# Patient Record
Sex: FEMALE | Race: WHITE | NOT HISPANIC OR LATINO | Employment: OTHER | URBAN - METROPOLITAN AREA
[De-identification: names, ages, dates, MRNs, and addresses within clinical notes are randomized per-mention and may not be internally consistent; named-entity substitution may affect disease eponyms.]

---

## 2017-02-23 ENCOUNTER — ALLSCRIPTS OFFICE VISIT (OUTPATIENT)
Dept: OTHER | Facility: OTHER | Age: 82
End: 2017-02-23

## 2017-02-23 ENCOUNTER — TRANSCRIBE ORDERS (OUTPATIENT)
Dept: LAB | Facility: CLINIC | Age: 82
End: 2017-02-23

## 2017-02-23 ENCOUNTER — APPOINTMENT (OUTPATIENT)
Dept: LAB | Facility: CLINIC | Age: 82
End: 2017-02-23
Payer: MEDICARE

## 2017-02-23 DIAGNOSIS — E78.5 HYPERLIPIDEMIA, UNSPECIFIED HYPERLIPIDEMIA TYPE: ICD-10-CM

## 2017-02-23 DIAGNOSIS — E78.5 HYPERLIPIDEMIA, UNSPECIFIED HYPERLIPIDEMIA TYPE: Primary | ICD-10-CM

## 2017-02-23 DIAGNOSIS — R06.00 DYSPNEA, UNSPECIFIED TYPE: ICD-10-CM

## 2017-02-23 DIAGNOSIS — R73.01 IMPAIRED FASTING GLUCOSE: ICD-10-CM

## 2017-02-23 LAB
ALBUMIN SERPL BCP-MCNC: 3.3 G/DL (ref 3.5–5)
ALP SERPL-CCNC: 115 U/L (ref 46–116)
ALT SERPL W P-5'-P-CCNC: 22 U/L (ref 12–78)
ANION GAP SERPL CALCULATED.3IONS-SCNC: 8 MMOL/L (ref 4–13)
AST SERPL W P-5'-P-CCNC: 11 U/L (ref 5–45)
BASOPHILS # BLD AUTO: 0.03 THOUSANDS/ΜL (ref 0–0.1)
BASOPHILS NFR BLD AUTO: 0 % (ref 0–1)
BILIRUB SERPL-MCNC: 0.47 MG/DL (ref 0.2–1)
BUN SERPL-MCNC: 23 MG/DL (ref 5–25)
CALCIUM SERPL-MCNC: 9.6 MG/DL (ref 8.3–10.1)
CHLORIDE SERPL-SCNC: 104 MMOL/L (ref 100–108)
CHOLEST SERPL-MCNC: 266 MG/DL (ref 50–200)
CO2 SERPL-SCNC: 27 MMOL/L (ref 21–32)
CREAT SERPL-MCNC: 0.98 MG/DL (ref 0.6–1.3)
CREAT UR-MCNC: 137 MG/DL
EOSINOPHIL # BLD AUTO: 0.25 THOUSAND/ΜL (ref 0–0.61)
EOSINOPHIL NFR BLD AUTO: 3 % (ref 0–6)
ERYTHROCYTE [DISTWIDTH] IN BLOOD BY AUTOMATED COUNT: 12.7 % (ref 11.6–15.1)
EST. AVERAGE GLUCOSE BLD GHB EST-MCNC: 189 MG/DL
GFR SERPL CREATININE-BSD FRML MDRD: 54.7 ML/MIN/1.73SQ M
GLUCOSE SERPL-MCNC: 141 MG/DL (ref 65–140)
HBA1C MFR BLD: 8.2 % (ref 4.2–6.3)
HCT VFR BLD AUTO: 39.1 % (ref 34.8–46.1)
HDLC SERPL-MCNC: 40 MG/DL (ref 40–60)
HGB BLD-MCNC: 12.7 G/DL (ref 11.5–15.4)
LDLC SERPL CALC-MCNC: 175 MG/DL (ref 0–100)
LYMPHOCYTES # BLD AUTO: 2.91 THOUSANDS/ΜL (ref 0.6–4.47)
LYMPHOCYTES NFR BLD AUTO: 29 % (ref 14–44)
MCH RBC QN AUTO: 30.4 PG (ref 26.8–34.3)
MCHC RBC AUTO-ENTMCNC: 32.5 G/DL (ref 31.4–37.4)
MCV RBC AUTO: 94 FL (ref 82–98)
MICROALBUMIN UR-MCNC: 38.5 MG/L (ref 0–20)
MICROALBUMIN/CREAT 24H UR: 28 MG/G CREATININE (ref 0–30)
MONOCYTES # BLD AUTO: 0.81 THOUSAND/ΜL (ref 0.17–1.22)
MONOCYTES NFR BLD AUTO: 8 % (ref 4–12)
NEUTROPHILS # BLD AUTO: 5.97 THOUSANDS/ΜL (ref 1.85–7.62)
NEUTS SEG NFR BLD AUTO: 60 % (ref 43–75)
NRBC BLD AUTO-RTO: 0 /100 WBCS
PLATELET # BLD AUTO: 283 THOUSANDS/UL (ref 149–390)
PMV BLD AUTO: 10.1 FL (ref 8.9–12.7)
POTASSIUM SERPL-SCNC: 4.4 MMOL/L (ref 3.5–5.3)
PROT SERPL-MCNC: 7.1 G/DL (ref 6.4–8.2)
RBC # BLD AUTO: 4.18 MILLION/UL (ref 3.81–5.12)
SODIUM SERPL-SCNC: 139 MMOL/L (ref 136–145)
TRIGL SERPL-MCNC: 256 MG/DL
TSH SERPL DL<=0.05 MIU/L-ACNC: 1.73 UIU/ML (ref 0.36–3.74)
WBC # BLD AUTO: 10.03 THOUSAND/UL (ref 4.31–10.16)

## 2017-02-23 PROCEDURE — 83036 HEMOGLOBIN GLYCOSYLATED A1C: CPT

## 2017-02-23 PROCEDURE — 82570 ASSAY OF URINE CREATININE: CPT

## 2017-02-23 PROCEDURE — 84443 ASSAY THYROID STIM HORMONE: CPT

## 2017-02-23 PROCEDURE — 85025 COMPLETE CBC W/AUTO DIFF WBC: CPT

## 2017-02-23 PROCEDURE — 80061 LIPID PANEL: CPT

## 2017-02-23 PROCEDURE — 80053 COMPREHEN METABOLIC PANEL: CPT

## 2017-02-23 PROCEDURE — 82043 UR ALBUMIN QUANTITATIVE: CPT

## 2017-02-23 PROCEDURE — 36415 COLL VENOUS BLD VENIPUNCTURE: CPT

## 2017-03-06 ENCOUNTER — TRANSCRIBE ORDERS (OUTPATIENT)
Dept: ADMINISTRATIVE | Facility: HOSPITAL | Age: 82
End: 2017-03-06

## 2017-03-06 DIAGNOSIS — R01.1 UNDIAGNOSED CARDIAC MURMURS: Primary | ICD-10-CM

## 2017-03-06 DIAGNOSIS — R06.02 SHORTNESS OF BREATH: ICD-10-CM

## 2017-03-06 DIAGNOSIS — Z12.31 VISIT FOR SCREENING MAMMOGRAM: Primary | ICD-10-CM

## 2017-03-06 DIAGNOSIS — R06.02 SHORTNESS OF BREATH: Primary | ICD-10-CM

## 2017-03-07 ENCOUNTER — GENERIC CONVERSION - ENCOUNTER (OUTPATIENT)
Dept: OTHER | Facility: OTHER | Age: 82
End: 2017-03-07

## 2017-03-08 ENCOUNTER — TRANSCRIBE ORDERS (OUTPATIENT)
Dept: ADMINISTRATIVE | Facility: HOSPITAL | Age: 82
End: 2017-03-08

## 2017-03-08 ENCOUNTER — HOSPITAL ENCOUNTER (OUTPATIENT)
Dept: MAMMOGRAPHY | Facility: CLINIC | Age: 82
Discharge: HOME | End: 2017-03-08
Payer: MEDICARE

## 2017-03-08 ENCOUNTER — APPOINTMENT (OUTPATIENT)
Dept: NON INVASIVE DIAGNOSTICS | Facility: CLINIC | Age: 82
End: 2017-03-08
Payer: MEDICARE

## 2017-03-08 ENCOUNTER — HOSPITAL ENCOUNTER (OUTPATIENT)
Dept: RADIOLOGY | Facility: CLINIC | Age: 82
Discharge: HOME | End: 2017-03-08
Payer: MEDICARE

## 2017-03-08 ENCOUNTER — HOSPITAL ENCOUNTER (OUTPATIENT)
Dept: NON INVASIVE DIAGNOSTICS | Facility: CLINIC | Age: 82
Discharge: HOME | End: 2017-03-08
Payer: MEDICARE

## 2017-03-08 DIAGNOSIS — R01.1 UNDIAGNOSED CARDIAC MURMURS: ICD-10-CM

## 2017-03-08 DIAGNOSIS — Z12.31 VISIT FOR SCREENING MAMMOGRAM: ICD-10-CM

## 2017-03-08 DIAGNOSIS — R06.02 SHORTNESS OF BREATH: ICD-10-CM

## 2017-03-08 DIAGNOSIS — R06.02 SHORTNESS OF BREATH: Primary | ICD-10-CM

## 2017-03-08 PROCEDURE — G0202 SCR MAMMO BI INCL CAD: HCPCS

## 2017-03-08 PROCEDURE — 77063 BREAST TOMOSYNTHESIS BI: CPT

## 2017-03-08 PROCEDURE — 93306 TTE W/DOPPLER COMPLETE: CPT

## 2017-03-08 PROCEDURE — 71020 HB CHEST X-RAY 2VW FRONTAL&LATL: CPT

## 2017-03-10 ENCOUNTER — GENERIC CONVERSION - ENCOUNTER (OUTPATIENT)
Dept: OTHER | Facility: OTHER | Age: 82
End: 2017-03-10

## 2017-03-16 ENCOUNTER — GENERIC CONVERSION - ENCOUNTER (OUTPATIENT)
Dept: OTHER | Facility: OTHER | Age: 82
End: 2017-03-16

## 2017-03-17 ENCOUNTER — GENERIC CONVERSION - ENCOUNTER (OUTPATIENT)
Dept: OTHER | Facility: OTHER | Age: 82
End: 2017-03-17

## 2017-03-17 ENCOUNTER — HOSPITAL ENCOUNTER (OUTPATIENT)
Dept: PULMONOLOGY | Facility: HOSPITAL | Age: 82
Discharge: HOME | End: 2017-03-17
Payer: MEDICARE

## 2017-03-17 DIAGNOSIS — R06.02 SHORTNESS OF BREATH: ICD-10-CM

## 2017-03-17 PROCEDURE — 94729 DIFFUSING CAPACITY: CPT

## 2017-03-17 PROCEDURE — 94726 PLETHYSMOGRAPHY LUNG VOLUMES: CPT

## 2017-03-17 PROCEDURE — 94010 BREATHING CAPACITY TEST: CPT

## 2017-03-17 PROCEDURE — 94760 N-INVAS EAR/PLS OXIMETRY 1: CPT

## 2017-03-23 ENCOUNTER — ALLSCRIPTS OFFICE VISIT (OUTPATIENT)
Dept: OTHER | Facility: OTHER | Age: 82
End: 2017-03-23

## 2017-06-01 ENCOUNTER — ALLSCRIPTS OFFICE VISIT (OUTPATIENT)
Dept: OTHER | Facility: OTHER | Age: 82
End: 2017-06-01

## 2017-06-07 DIAGNOSIS — E11.9 TYPE 2 DIABETES MELLITUS WITHOUT COMPLICATIONS (HCC): ICD-10-CM

## 2017-06-07 DIAGNOSIS — E78.5 HYPERLIPIDEMIA: ICD-10-CM

## 2017-07-03 ENCOUNTER — APPOINTMENT (OUTPATIENT)
Dept: LAB | Facility: CLINIC | Age: 82
End: 2017-07-03
Payer: MEDICARE

## 2017-07-03 ENCOUNTER — TRANSCRIBE ORDERS (OUTPATIENT)
Dept: LAB | Facility: CLINIC | Age: 82
End: 2017-07-03

## 2017-07-03 DIAGNOSIS — E78.5 HYPERLIPIDEMIA: ICD-10-CM

## 2017-07-03 DIAGNOSIS — E11.9 DIABETES MELLITUS WITHOUT COMPLICATION (HCC): Primary | ICD-10-CM

## 2017-07-03 DIAGNOSIS — E11.9 TYPE 2 DIABETES MELLITUS WITHOUT COMPLICATIONS (HCC): ICD-10-CM

## 2017-07-03 DIAGNOSIS — E11.9 DIABETES MELLITUS WITHOUT COMPLICATION (HCC): ICD-10-CM

## 2017-07-03 DIAGNOSIS — E78.5 OTHER AND UNSPECIFIED HYPERLIPIDEMIA: ICD-10-CM

## 2017-07-03 LAB
ALBUMIN SERPL BCP-MCNC: 3.5 G/DL (ref 3.5–5)
ALP SERPL-CCNC: 119 U/L (ref 46–116)
ALT SERPL W P-5'-P-CCNC: 26 U/L (ref 12–78)
ANION GAP SERPL CALCULATED.3IONS-SCNC: 7 MMOL/L (ref 4–13)
AST SERPL W P-5'-P-CCNC: 14 U/L (ref 5–45)
BILIRUB SERPL-MCNC: 0.47 MG/DL (ref 0.2–1)
BUN SERPL-MCNC: 21 MG/DL (ref 5–25)
CALCIUM SERPL-MCNC: 9.3 MG/DL (ref 8.3–10.1)
CHLORIDE SERPL-SCNC: 102 MMOL/L (ref 100–108)
CHOLEST SERPL-MCNC: 247 MG/DL (ref 50–200)
CO2 SERPL-SCNC: 28 MMOL/L (ref 21–32)
CREAT SERPL-MCNC: 0.88 MG/DL (ref 0.6–1.3)
EST. AVERAGE GLUCOSE BLD GHB EST-MCNC: 180 MG/DL
GFR SERPL CREATININE-BSD FRML MDRD: >60 ML/MIN/1.73SQ M
GLUCOSE P FAST SERPL-MCNC: 146 MG/DL (ref 65–99)
HBA1C MFR BLD: 7.9 % (ref 4.2–6.3)
HDLC SERPL-MCNC: 38 MG/DL (ref 40–60)
LDLC SERPL CALC-MCNC: 157 MG/DL (ref 0–100)
POTASSIUM SERPL-SCNC: 4.4 MMOL/L (ref 3.5–5.3)
PROT SERPL-MCNC: 7.1 G/DL (ref 6.4–8.2)
SODIUM SERPL-SCNC: 137 MMOL/L (ref 136–145)
TRIGL SERPL-MCNC: 262 MG/DL

## 2017-07-03 PROCEDURE — 80061 LIPID PANEL: CPT

## 2017-07-03 PROCEDURE — 80053 COMPREHEN METABOLIC PANEL: CPT

## 2017-07-03 PROCEDURE — 83036 HEMOGLOBIN GLYCOSYLATED A1C: CPT

## 2017-07-03 PROCEDURE — 36415 COLL VENOUS BLD VENIPUNCTURE: CPT

## 2017-07-05 ENCOUNTER — ALLSCRIPTS OFFICE VISIT (OUTPATIENT)
Dept: OTHER | Facility: OTHER | Age: 82
End: 2017-07-05

## 2017-07-05 ENCOUNTER — GENERIC CONVERSION - ENCOUNTER (OUTPATIENT)
Dept: OTHER | Facility: OTHER | Age: 82
End: 2017-07-05

## 2017-07-07 ENCOUNTER — GENERIC CONVERSION - ENCOUNTER (OUTPATIENT)
Dept: OTHER | Facility: OTHER | Age: 82
End: 2017-07-07

## 2017-07-10 ENCOUNTER — ALLSCRIPTS OFFICE VISIT (OUTPATIENT)
Dept: OTHER | Facility: OTHER | Age: 82
End: 2017-07-10

## 2017-10-06 ENCOUNTER — GENERIC CONVERSION - ENCOUNTER (OUTPATIENT)
Dept: OTHER | Facility: OTHER | Age: 82
End: 2017-10-06

## 2017-10-16 ENCOUNTER — TRANSCRIBE ORDERS (OUTPATIENT)
Dept: LAB | Facility: CLINIC | Age: 82
End: 2017-10-16

## 2017-10-16 ENCOUNTER — APPOINTMENT (OUTPATIENT)
Dept: LAB | Facility: CLINIC | Age: 82
End: 2017-10-16
Payer: MEDICARE

## 2017-10-16 DIAGNOSIS — E78.5 HYPERLIPIDEMIA, UNSPECIFIED HYPERLIPIDEMIA TYPE: ICD-10-CM

## 2017-10-16 DIAGNOSIS — E78.5 HYPERLIPIDEMIA, UNSPECIFIED HYPERLIPIDEMIA TYPE: Primary | ICD-10-CM

## 2017-10-16 DIAGNOSIS — E11.9 DIABETES MELLITUS WITHOUT COMPLICATION (HCC): ICD-10-CM

## 2017-10-16 LAB
ALBUMIN SERPL BCP-MCNC: 3.4 G/DL (ref 3.5–5)
ALP SERPL-CCNC: 125 U/L (ref 46–116)
ALT SERPL W P-5'-P-CCNC: 23 U/L (ref 12–78)
ANION GAP SERPL CALCULATED.3IONS-SCNC: 10 MMOL/L (ref 4–13)
AST SERPL W P-5'-P-CCNC: 15 U/L (ref 5–45)
BILIRUB SERPL-MCNC: 0.45 MG/DL (ref 0.2–1)
BUN SERPL-MCNC: 15 MG/DL (ref 5–25)
CALCIUM SERPL-MCNC: 9 MG/DL (ref 8.3–10.1)
CHLORIDE SERPL-SCNC: 101 MMOL/L (ref 100–108)
CHOLEST SERPL-MCNC: 194 MG/DL (ref 50–200)
CO2 SERPL-SCNC: 27 MMOL/L (ref 21–32)
CREAT SERPL-MCNC: 0.84 MG/DL (ref 0.6–1.3)
EST. AVERAGE GLUCOSE BLD GHB EST-MCNC: 169 MG/DL
GFR SERPL CREATININE-BSD FRML MDRD: 66 ML/MIN/1.73SQ M
GLUCOSE P FAST SERPL-MCNC: 132 MG/DL (ref 65–99)
HBA1C MFR BLD: 7.5 % (ref 4.2–6.3)
HDLC SERPL-MCNC: 38 MG/DL (ref 40–60)
LDLC SERPL CALC-MCNC: 106 MG/DL (ref 0–100)
POTASSIUM SERPL-SCNC: 4.1 MMOL/L (ref 3.5–5.3)
PROT SERPL-MCNC: 7.4 G/DL (ref 6.4–8.2)
SODIUM SERPL-SCNC: 138 MMOL/L (ref 136–145)
TRIGL SERPL-MCNC: 250 MG/DL

## 2017-10-16 PROCEDURE — 80061 LIPID PANEL: CPT

## 2017-10-16 PROCEDURE — 80053 COMPREHEN METABOLIC PANEL: CPT

## 2017-10-16 PROCEDURE — 36415 COLL VENOUS BLD VENIPUNCTURE: CPT

## 2017-10-16 PROCEDURE — 83036 HEMOGLOBIN GLYCOSYLATED A1C: CPT

## 2017-10-19 ENCOUNTER — GENERIC CONVERSION - ENCOUNTER (OUTPATIENT)
Dept: OTHER | Facility: OTHER | Age: 82
End: 2017-10-19

## 2017-10-23 ENCOUNTER — GENERIC CONVERSION - ENCOUNTER (OUTPATIENT)
Dept: OTHER | Facility: OTHER | Age: 82
End: 2017-10-23

## 2018-01-05 ENCOUNTER — ALLSCRIPTS OFFICE VISIT (OUTPATIENT)
Dept: OTHER | Facility: OTHER | Age: 83
End: 2018-01-05

## 2018-01-10 NOTE — RESULT NOTES
Verified Results  (1) COMPREHENSIVE METABOLIC PANEL [id number] 46:92EV Patrice Cantu Order Number: [id number]     Test Name Result Flag Reference   SODIUM 137 mmol/L  136-145   POTASSIUM 4 4 mmol/L  3 5-5 3   CHLORIDE 102 mmol/L  100-108   CARBON DIOXIDE 28 mmol/L  21-32   ANION GAP (CALC) 7 mmol/L  4-13   BLOOD UREA NITROGEN 21 mg/dL  5-25   CREATININE 0 88 mg/dL  0 60-1 30   Standardized to IDMS reference method   CALCIUM 9 3 mg/dL  8 3-10 1   BILI, TOTAL 0 47 mg/dL  0 20-1 00   ALK PHOSPHATAS 119 U/L H    ALT (SGPT) 26 U/L  12-78   AST(SGOT) 14 U/L  5-45   ALBUMIN 3 5 g/dL  3 5-5 0   TOTAL PROTEIN 7 1 g/dL  6 4-8 2   eGFR Non-African American      >60 0 ml/min/1 73sq [facility] [employer] Disease Education Program recommendations are as follows:  GFR calculation is accurate only with a steady state creatinine  Chronic Kidney disease less than 60 ml/min/1 73 sq  meters  Kidney failure less than 15 ml/min/1 73 sq  meters     GLUCOSE FASTING 146 mg/dL H 65-99     (1) LIPID PANEL FASTING W DIRECT LDL REFLEX [id number] 09:26AM Gala Llanes     Test Name Result Flag Reference   CHOLESTEROL 247 mg/dL H    LDL CHOLESTEROL CALCULATED 157 mg/dL H 0-100   This is a fasting blood test  Water,black tea or black  coffee only after 9:00pm the night before test  Drink 2 glasses of water the morning of test         Triglyceride:         Normal              <150 mg/dl       Borderline High    150-199 mg/dl       High               200-499 mg/dl       Very High          >499 mg/dl  Cholesterol:         Desirable        <200 mg/dl      Borderline High  200-239 mg/dl      High             >239 mg/dl  HDL Cholesterol:        High    >59 mg/dL      Low     <41 mg/dL  LDL Cholesterol:        Optimal          <100 mg/dl        Near Optimal     100-129 mg/dl        Above Optimal          Borderline High   130-159 mg/dl          High              160-189 mg/dl          Very High        >189 mg/dl  LDL CALCULATED:    This screening LDL is a calculated result  It does not have the accuracy of the Direct Measured LDL in the monitoring of patients with hyperlipidemia and/or statin therapy  Direct Measure LDL (CAL688) must be ordered separately in these patients  TRIGLYCERIDES 262 mg/dL H <=150   Specimen collection should occur prior to N-Acetylcysteine or Metamizole administration due to the potential for falsely depressed results  HDL,DIRECT 38 mg/dL L 40-60   Specimen collection should occur prior to Metamizole administration due to the potential for falsely depressed results  (1) HEMOGLOBIN A1C [id number] 09:26AM Gala Llanes     Test Name Result Flag Reference   HEMOGLOBIN A1C 7 9 % H 4 2-6 3   EST  AVG   GLUCOSE 180 mg/dl

## 2018-01-11 NOTE — RESULT NOTES
Verified Results  MAMMO SCREENING BILATERAL W 3D & CAD [id number] 12:43PM Nathaniel Perez     Test Name Result Flag Reference   MAMMO SCREENING BILATERAL W 3D & CAD (Report)     Patient History:   Patient is postmenopausal and is nulliparous  Family history of breast cancer at age 79 in mother, breast    cancer at age 76 in maternal grandmother  Patient has never smoked  Patient's BMI is 49 3  Reason for exam: screening, asymptomatic  Mammo Screening Bilateral W DBT and CAD: March 8, 2017 - Check In   #: [de-identified]   2D/3D Procedure   3D views: Bilateral MLO view(s) were taken  2D views: Bilateral CC view(s) were taken  Technologist: MARTHA Bowles (R)(M)   Prior study comparison: June 23, 2014, mammo screening bilateral    W CAD, performed at [facility]  There are scattered fibroglandular densities  No dominant soft tissue mass, architectural distortion or    suspicious calcifications are noted in either breast   The skin    and nipple structures are within normal limits  Scattered benign   appearing calcifications are noted  No significant changes when compared with prior studies  ACR BI-RADSï¾® Assessments: BiRad:2 - Benign     Recommendation:   Routine screening mammogram of both breasts in 1 year  A    reminder letter will be scheduled  8-10% of cancers will be missed on mammography  Management of a    palpable abnormality must be based on clinical grounds  Patients    will be notified of their results via letter from our facility  Accredited by [employer] and FDA       Transcription Location: MARTHA Patel 98: [id number]     Risk Value(s):   Tyrer-Cuzick 10 Year: 7 000%, Tyrer-Cuzick Lifetime: 7 000%,    Myriad Table: 1 5%, IZABEL 5 Year: 3 3%, NCI Lifetime: 5 5%

## 2018-01-11 NOTE — RESULT NOTES
Message   Can you please let patient know that her chest x-ray was normal   Her echocardiogram showed moderate calcification of the mitral valve however she only had mild regurgitation  In addition, her left ventricle showed delayed filling  This can be also related to age  I would like patient to schedule appt with cardiology for further evaluation  Thank you     Verified Results  * XR CHEST PA & LATERAL [id number] 12:48PM Roosvelt Bigness, Gala     Test Name Result Flag Reference   XR CHEST PA & LATERAL (Report)     CHEST      INDICATION: R06 02: Shortness of breath  History taken directly from the electronic ordering system  COMPARISON: None     VIEWS: Frontal and lateral projections     IMAGES: 2     FINDINGS:        Heart is normal in size  No confluent airspace consolidation  No pleural effusion or pneumothorax  Visualized osseous structures appear within normal limits for the patient's age  IMPRESSION:     No acute cardiopulmonary disease  Workstation performed: [id number]     Signed by:   Je Kirby MD   3/9/17     ECHO COMPLETE WITH CONTRAST IF INDICATED [id number] 12:44PM Roosvelt Bigness, Gala     Test Name Result Flag Reference   ECHO COMPLETE WITH CONTRAST IF INDICATED (Report)     [facility], [address] [place] [address]   [phone]     Transthoracic Echocardiogram   2D, M-mode, Doppler, and Color Doppler     Study date: 08-Mar-2017     Patient: Jose Juan Cross   MR number: [id number]   Account number: [de-identified]   : 1935   Age: 80 years   Gender: Female   Status: Outpatient   [facility]: [facility]   Height: 59 in   Weight: 247 lb   BP: 126/ 76 mmHg     Indications: Murmur       Diagnoses: R01 1 - Cardiac murmur, unspecified     Sonographer: Padmini LATHAM, [facility]   Primary Physician: Louvenia Schwab, MD   Referring Physician: Louvenia Schwab, MD   Group: [facility]   Interpreting Physician: Shaji Goodman MD     SUMMARY     LEFT VENTRICLE:   Systolic function was normal by visual assessment  Ejection fraction was estimated to be 55 %  There were no regional wall motion abnormalities  Wall thickness was mildly increased  Doppler parameters were consistent with abnormal left ventricular relaxation (grade 1 diastolic dysfunction)  MITRAL VALVE:   There was moderate annular calcification  Transmitral velocity was increased due to valvular stenosis  There was very mild stenosis  AORTIC VALVE:   Transaortic velocity was increased due to valvular stenosis  There was mild stenosis  TRICUSPID VALVE:   There was trace regurgitation  HISTORY: PRIOR HISTORY: Risk factors: diabetes, hypercholesterolemia, morbid obesity, and a family history of coronary artery disease  PROCEDURE: The study was performed in the [address] [place] [address]  This was a routine study  The transthoracic approach was used  The study included complete 2D imaging, M-mode, complete spectral Doppler, and color Doppler  Images were   obtained from the parasternal, apical, subcostal, and suprasternal notch acoustic windows  Image quality was adequate  LEFT VENTRICLE: Size was normal  Systolic function was normal by visual assessment  Ejection fraction was estimated to be 55 %  There were no regional wall motion abnormalities  Wall thickness was mildly increased  DOPPLER: There was an   increased relative contribution of atrial contraction to ventricular filling  Doppler parameters were consistent with abnormal left ventricular relaxation (grade 1 diastolic dysfunction)  RIGHT VENTRICLE: The size was normal  Systolic function was normal  DOPPLER: Systolic pressure was not estimated  LEFT ATRIUM: Size was normal      RIGHT ATRIUM: Size was normal      MITRAL VALVE: There was moderate annular calcification  Valve structure was normal  There was mild calcification   There was mildly reduced leaflet separation  DOPPLER: Transmitral velocity was increased due to valvular stenosis  There was   very mild stenosis  There was no regurgitation  AORTIC VALVE: The valve was trileaflet  Leaflets exhibited mildly increased thickness, mild calcification, and mildly reduced cuspal separation  DOPPLER: Transaortic velocity was increased due to valvular stenosis  There was mild stenosis  There was no regurgitation  TRICUSPID VALVE: The valve structure was normal  There was normal leaflet separation  DOPPLER: The transtricuspid velocity was within the normal range  There was no evidence for stenosis  There was trace regurgitation  PULMONIC VALVE: Leaflets exhibited normal thickness, no calcification, and normal cuspal separation  DOPPLER: The transpulmonic velocity was within the normal range  There was no regurgitation  PERICARDIUM: There was no pericardial effusion  AORTA: The root exhibited normal size  SYSTEMIC VEINS: IVC: The inferior vena cava was normal in size and course  Respirophasic changes were normal      SYSTEM MEASUREMENT TABLES     2D   %FS: 43 02 %   Ao Diam: 3 26 cm   EDV(Teich): 50 39 ml   EF(Cube): 81 5 %   EF(Teich): 75 18 %   ESV(Cube): 7 84 ml   ESV(Teich): 12 51 ml   IVSd: 1 17 cm   LA Area: 12 19 cm2   LA Diam: 3 28 cm   LVEDV MOD A4C: 56 88 ml   LVEF MOD A4C: 75 97 %   LVESV MOD A4C: 13 67 ml   LVIDd: 3 49 cm   LVIDs: 1 99 cm   LVLd A4C: 6 77 cm   LVLs A4C: 4 46 cm   LVOT Diam: 1 92 cm   LVPWd: 1 17 cm   RA Area: 11 86 cm2   RV Diam: 4 13 cm   SV MOD A4C: 43 21 ml   SV(Cube): 34 53 ml   SV(Teich): 37 88 ml     CW   AV Env  Ti: 280 96 ms   AV VTI: 44 58 cm   AV Vmax: 1 83 m/s   AV Vmax: 2 23 m/s   AV Vmean: 1 58 m/s   AV maxP 35 mmHg   AV maxP 93 mmHg   AV meanP 08 mmHg   HR: 105 39 BPM   MV PHT: 132 52 ms   MV VTI: 41 56 cm   MV Vmax: 1 55 m/s   MV Vmean: 0 75 m/s   MV maxP 57 mmHg   MV meanP 77 mmHg   MVA By PHT: 1 66 cm2     MM   TAPSE: 2 27 cm PW   SANDRA (VTI): 1 73 cm2   SANDRA Vmax: 1 43 cm2   SANDRA Vmax: 1 75 cm2   E': 0 05 m/s   E/E': 14 68   HR: 136 83 BPM   LVCO Dopp: 10 55 L/min   LVOT Env  Ti: 354 9 ms   LVOT VTI: 26 7 cm   LVOT Vmax: 1 11 m/s   LVOT Vmean: 0 75 m/s   LVOT maxP 91 mmHg   LVOT meanP 66 mmHg   LVSV Dopp: 77 07 ml   MV A Charbel: 1 45 m/s   MV Dec Galax: 1 66 m/s2   MV DecT: 434 61 ms   MV E Charbel: 0 72 m/s   MV E/A Ratio: 0 5   MVA (VTI): 1 85 cm2     [facility] Accredited Echocardiography Laboratory     Prepared and electronically signed by     Ryan Mcleod MD   Signed 08-Mar-2017 14:40:44

## 2018-01-11 NOTE — RESULT NOTES
Message   Spoke to patient regarding blood work results  Cholesterol is elevated and calculated ASCVD risk is noted to be 38 1%  Patient is agreeable on starting a low-dose atorvastatin  Will call in Rx for atorvastatin 10 mg daily  In addition, her A1c was noted to be 8 2  She is agreeable on starting metformin  I will call in metformin 500 mg to take in the evening  I have also discussed the importance of lifestyle modifications including diet, exercise, weight loss  Patient states she will restart Weight Watchers  I will also mail her referral for Dr Emma Schroeder  She is not interested in nutritionist for now  Will mail blood work Rx for A1c, CMP and lipid panel to be done in 3 months  Verified Results  (1) CBC/PLT/DIFF [id number] 09:23AM Gala Llanes     Test Name Result Flag Reference   WBC COUNT 10 03 Thousand/uL  4 31-10 16   RBC COUNT 4 18 Million/uL  3 81-5 12   HEMOGLOBIN 12 7 g/dL  11 5-15 4   HEMATOCRIT 39 1 %  34 8-46  1   MCV 94 fL  82-98   MCH 30 4 pg  26 8-34 3   MCHC 32 5 g/dL  31 4-37 4   RDW 12 7 %  11 6-15 1   MPV 10 1 fL  8 9-12 7   PLATELET COUNT 191 Thousands/uL  149-390   nRBC AUTOMATED 0 /100 WBCs     NEUTROPHILS RELATIVE PERCENT 60 %  43-75   LYMPHOCYTES RELATIVE PERCENT 29 %  14-44   MONOCYTES RELATIVE PERCENT 8 %  4-12   EOSINOPHILS RELATIVE PERCENT 3 %  0-6   BASOPHILS RELATIVE PERCENT 0 %  0-1   NEUTROPHILS ABSOLUTE COUNT 5 97 Thousands/? ??L  1 85-7 62   LYMPHOCYTES ABSOLUTE COUNT 2 91 Thousands/? ??L  0 60-4 47   MONOCYTES ABSOLUTE COUNT 0 81 Thousand/? ??L  0 17-1 22   EOSINOPHILS ABSOLUTE COUNT 0 25 Thousand/? ??L  0 00-0 61   BASOPHILS ABSOLUTE COUNT 0 03 Thousands/? ??L  0 00-0 10     (1) COMPREHENSIVE METABOLIC PANEL [id number] 38:78QB Gala Llanes     Test Name Result Flag Reference   GLUCOSE,RANDM 141 mg/dL H    If the patient is fasting, the ADA then defines impaired fasting glucose as > 100 mg/dL and diabetes as > or equal to 123 mg/dL     SODIUM 139 mmol/L 136-145   POTASSIUM 4 4 mmol/L  3 5-5 3   CHLORIDE 104 mmol/L  100-108   CARBON DIOXIDE 27 mmol/L  21-32   ANION GAP (CALC) 8 mmol/L  4-13   BLOOD UREA NITROGEN 23 mg/dL  5-25   CREATININE 0 98 mg/dL  0 60-1 30   Standardized to IDMS reference method   CALCIUM 9 6 mg/dL  8 3-10 1   BILI, TOTAL 0 47 mg/dL  0 20-1 00   ALK PHOSPHATAS 115 U/L     ALT (SGPT) 22 U/L  12-78   AST(SGOT) 11 U/L  5-45   ALBUMIN 3 3 g/dL L 3 5-5 0   TOTAL PROTEIN 7 1 g/dL  6 4-8 2   eGFR Non-African American 54 7 ml/min/1 73sq [facility] Disease Education Program recommendations are as follows:  GFR calculation is accurate only with a steady state creatinine  Chronic Kidney disease less than 60 ml/min/1 73 sq  meters  Kidney failure less than 15 ml/min/1 73 sq  meters  (1) LIPID PANEL FASTING W DIRECT LDL REFLEX [id number] 09:23AM Elizabeth Edmund     Test Name Result Flag Reference   CHOLESTEROL 266 mg/dL H    LDL CHOLESTEROL CALCULATED 175 mg/dL H 0-100   Triglyceride:         Normal              <150 mg/dl       Borderline High    150-199 mg/dl       High               200-499 mg/dl       Very High          >499 mg/dl  Cholesterol:         Desirable        <200 mg/dl      Borderline High  200-239 mg/dl      High             >239 mg/dl  HDL Cholesterol:        High    >59 mg/dL      Low     <41 mg/dL  LDL Cholesterol:        Optimal          <100 mg/dl        Near Optimal     100-129 mg/dl        Above Optimal          Borderline High   130-159 mg/dl          High              160-189 mg/dl          Very High        >189 mg/dl  LDL CALCULATED:    This screening LDL is a calculated result  It does not have the accuracy of the Direct Measured LDL in the monitoring of patients with hyperlipidemia and/or statin therapy  Direct Measure LDL (QUX880) must be ordered separately in these patients     TRIGLYCERIDES 256 mg/dL H <=150   Specimen collection should occur prior to N-Acetylcysteine or Metamizole administration due to the potential for falsely depressed results  HDL,DIRECT 40 mg/dL  40-60   Specimen collection should occur prior to Metamizole administration due to the potential for falsely depressed results  (1) TSH WITH FT4 REFLEX [id number] 09:23AM Gala Llanes     Test Name Result Flag Reference   TSH 1 730 uIU/mL  0 358-3 740   Patients undergoing fluorescein dye angiography may retain small amounts of fluorescein in the body for 48-72 hours post procedure  Samples containing fluorescein can produce falsely depressed TSH values  If the patient had this procedure,a specimen should be resubmitted post fluorescein clearance  The recommended reference ranges for TSH during pregnancy are as follows:  First trimester 0 1 to 2 5 uIU/mL  Second trimester  0 2 to 3 0 uIU/mL  Third trimester 0 3 to 3 0 uIU/m     (1) MICROALBUMIN CREATININE RATIO, RANDOM URINE [id number] 09:23AM Gala Llanes     Test Name Result Flag Reference   MICROALBUMIN/ CREAT R 28 mg/g creatinine  0-30   MICROALBUMIN,URINE 38 5 mg/L H 0 0-20 0   CREATININE URINE 137 0 mg/dL       (1) HEMOGLOBIN A1C [id number] 09:23AM Gala Llanes     Test Name Result Flag Reference   HEMOGLOBIN A1C 8 2 % H 4 2-6 3   EST  AVG   GLUCOSE 189 mg/dl

## 2018-01-12 VITALS
WEIGHT: 247 LBS | SYSTOLIC BLOOD PRESSURE: 126 MMHG | DIASTOLIC BLOOD PRESSURE: 76 MMHG | HEIGHT: 59 IN | HEART RATE: 74 BPM | BODY MASS INDEX: 49.8 KG/M2 | TEMPERATURE: 97.4 F | RESPIRATION RATE: 18 BRPM

## 2018-01-12 NOTE — PROGRESS NOTES
Assessment   1  Pre-op examination (V72 84) (Z01 818)   2  Preoperative clearance (V72 84) (Z01 818)   3  Left cataract (366 9) (H26 9)    Plan   Preoperative clearance    · EKG/ECG- POC; Status:Complete;   Done: 45YZY7779 11:54AM    Discussion/Summary   Surgical Clearance: She is at a LOW TO MODERATE risk from a cardiovascular standpoint at this time without any additional cardiac testing  Reevaluation needed, if she should present with symptoms prior to surgery/procedure  53-year-old female here for preop exam and clearance for left eye cataract surgery to be done on 1/15 by Dr Sho Jacobson  Patient is low to moderate risk for low risk procedure  Patient is at acceptable risk  Benefits outweigh the risk  May proceed  The patient was counseled regarding diagnostic results,-- instructions for management,-- risk factor reductions,-- prognosis,-- patient and family education,-- impressions,-- risks and benefits of treatment options,-- importance of compliance with treatment  Chief Complaint   pt is here for pre-op clearance for cataract surgery being done by Carina Gao on 1-15-18, meds and allergies reviewed      History of Present Illness   Pre-Op Visit:    Surgical Risk Assessment:      Prior Anesthesia: She had prior anesthesia-- and-- no prior adverse reaction to general anesthesia  Pertinent Past Medical History: no angina, no arrythmia, no CAD, CAD without prior MI, CAD without recent PCI, no CHF, no chronic liver disease, no acute hepatitis, no secondary hypercoagulable state, diabetes, does not use insulin, no thyroid disease, no neck osteoarthrosis, no TMJ osteoarthrosis, does not wear dentures, no seizure disorder, no CVA, no asthma, no COPD, not DORIAN, no renal disease, no low serum albumin and obesity  Exercise Capacity: able to walk four blocks without symptoms-- and-- able to walk two flights of stairs without symptoms   Symptoms: no easy bleeding,-- no easy bruising,-- no frequent nosebleeds,-- no chest pain,-- no cough,-- no dyspnea,-- no edema,-- no palpitations-- and-- no wheezing  Additional DORIAN risk factors include high BMI-- and-- age over 48, but-- female gender-- and-- normal neck circumference  Pertinent Family History: no pertinent family history  Living Situation: home is secure and supportive and no post-op concerns with her living situation  HPI: 82-year-old female here for preop exam and clearance for left eye cataract surgery to be done on 1/15 by Dr Chet Hernandez  patient states she is doing well overall  Review of Systems        Constitutional: No fever, no chills, feels well, no tiredness, no recent weight gain or weight loss  Eyes: as noted in HPI  Cardiovascular: No complaints of slow heart rate, no fast heart rate, no chest pain, no palpitations, no leg claudication, no lower extremity edema  Respiratory: No complaints of shortness of breath, no wheezing, no cough, no SOB on exertion, no orthopnea, no PND  Active Problems   1  Arthritis (716 90) (M19 90)   2  Carcinoma in situ (234 9) (D09 9)   3  Chronic back pain (724 5,338 29) (M54 9,G89 29)   4  Dermatophytosis of nail (110 1) (B35 1)   5  Diabetes mellitus (250 00) (E11 9)   6  Diabetes mellitus type II, controlled (250 00) (E11 9)   7  Elevated alkaline phosphatase level (790 5) (R74 8)   8  Hammertoe (735 4) (M20 40)   9  Heart murmur (785 2) (R01 1)   10  Hyperlipidemia (272 4) (E78 5)   11  Macular degeneration (362 50) (H35 30)   12  Obesity, morbid, BMI 40 0-49 9 (278 01) (E66 01)   13  Pre-op examination (V72 84) (Z01 818)   14   Psoriasis (696 1) (L40 9)    Past Medical History    · History of Candidiasis, cutaneous (112 3) (B37 2)   · History of Colon cancer screening (V76 51) (Z12 11)   · History of Encounter to establish care (V65 8) (Z76 89)   · History of screening mammography (V15 89) (Z92 89)   · History of shortness of breath (V13 89) (G92 580)   · History of Impaired fasting glucose (790 21) (R73 01)    Surgical History    · History of Cholecystectomy   · History of Episiotomy   · History of Labioplasty   · History of Tonsillectomy   · History of Total Abdominal Hysterectomy With Bilateral Salpingo-Oophorectomy    Family History   Mother    · Family history of abdominal aortic aneurysm (AAA) (V17 49) (Z82 49)   · Family history of cerebrovascular accident (CVA) (V17 1) (Z82 3)   · Family history of malignant neoplasm of breast (V16 3) (Z80 3)  Father    · Family history of myocardial infarction (V17 3) (Z82 49)  Sister    · Family history of CAD (coronary artery disease)   · Family history of abdominal aortic aneurysm (AAA) (V17 49) (Z82 49)  Brother    · Family history of cerebrovascular accident (CVA) (V17 1) (Z82 3)  Grandfather    · Family history of cerebrovascular accident (CVA) (V17 1) (Z82 3)  Family History    · Family history of malignant neoplasm of breast (V16 3) (Z80 3)    Social History    · Caffeine use (V49 89) (F15 90)   · 4 cans diet pepsi daily   · Female in homosexual marriage   · Never a smoker   · No alcohol use    Current Meds    1  Atorvastatin Calcium 10 MG Oral Tablet; Take 1 tablet daily  Requested for: 02ROH9348;     Last Rx:23Oct2017 Ordered   2  Daily Multivitamin TABS; TAKE 1 TABLET DAILY; Therapy: (Hemanth Vizcarra) to Recorded   3  MetFORMIN HCl - 500 MG Oral Tablet; TAKE 1 TABLET TWICE DAILY WITH FOOD; Therapy: 11FUR2356 to ([facility]85[facility]7187)  Requested for: 98NLK4829; Last     LL:84WNS3624 Ordered   4  Nystatin 298918 UNIT/GM External Powder; APPLY 2-3 TIMES DAILY TO AFFECTED     AREA(S); Therapy: 16JKE6639 to (Last Rx:01Jun2017)  Requested for: 01Jun2017 Ordered   5  PreserVision AREDS Oral Capsule; TAKE AS DIRECTED; Therapy: 82XXI4063 to Recorded   6  Triamcinolone Acetonide 0 1 % External Cream; APPLY  AND RUB  IN A THIN FILM TO     AFFECTED AREAS TWICE DAILY  (AM AND PM); Therapy: (Hemanth Vizcarra) to Recorded   7   Vitamin C 100 MG Oral Tablet; Therapy: 00WJI1687 to Recorded     The medication list was reviewed and updated today  Allergies   1  Adhesive Bandages MISC   2  sulfa  3  Shellfish    Vitals    Recorded: 82FYJ0394 08:10AM   Temperature 96 5 F   Heart Rate 82   Respiration 16   Systolic 287   Diastolic 84   Height 4 ft 11 in   Weight 244 lb 6 oz   BMI Calculated 49 36   BSA Calculated 2 01     Physical Exam        Constitutional      General appearance: No acute distress, well appearing and well nourished  Eyes      Conjunctiva and lids: No swelling, erythema or discharge  Pupils and irises: Equal, round, reactive to light  Ears, Nose, Mouth, and Throat      Oropharynx: Normal with no erythema, edema, exudate or lesions  Neck      Neck: Supple, symmetric, trachea midline, no masses  Thyroid: Normal, no thyromegaly  Pulmonary      Respiratory effort: No increased work of breathing or signs of respiratory distress  Auscultation of lungs: Clear to auscultation  Cardiovascular      Auscultation of heart: Normal rate and rhythm, normal S1 and S2, no murmurs  Carotid pulses: 2+ bilaterally  Examination of extremities for edema and/or varicosities: Normal        Results/Data   EKG/ECG- POC 93IYS4591 11:54AM Gala Llanes      Test Name Result Flag Reference   EKG/ECG neg          End of Encounter Meds   1  MetFORMIN HCl - 500 MG Oral Tablet; TAKE 1 TABLET TWICE DAILY WITH FOOD; Therapy: 60CRT7203 to (TJWTEZMD:90TMC5067)  Requested for: 23Oct2017; Last     CX:39PRM0416 Ordered  2  Daily Multivitamin TABS; TAKE 1 TABLET DAILY; Therapy: (Taniya Caballero) to Recorded   3  Vitamin C 100 MG Oral Tablet; Therapy: 02QEY9621 to Recorded  4  Atorvastatin Calcium 10 MG Oral Tablet (Lipitor); Take 1 tablet daily  Requested for:     89FGX9715; Last Rx:23Oct2017 Ordered  5  PreserVision AREDS Oral Capsule; TAKE AS DIRECTED; Therapy: 71WHW0419 to Recorded  6  Nystatin 587918 UNIT/GM External Powder; APPLY 2-3 TIMES DAILY TO AFFECTED     AREA(S); Therapy: 48BGH3752 to (Last Rx:01Jun2017)  Requested for: 01Jun2017 Ordered  7  Triamcinolone Acetonide 0 1 % External Cream; APPLY  AND RUB  IN A THIN FILM TO     AFFECTED AREAS TWICE DAILY  (AM AND PM);      Therapy: (Carolina Jason) to Recorded    Future Appointments      Date/Time Provider Specialty Site   03/05/2018 08:00 AM Franc Johnson MD Family Medicine [address] [place] [address]     Signatures    Electronically signed by : Brenda Herrera MD; Jan 11 2018  8:40AM EST                       (Author)

## 2018-01-13 VITALS
HEIGHT: 59 IN | WEIGHT: 225 LBS | TEMPERATURE: 98.3 F | HEART RATE: 78 BPM | DIASTOLIC BLOOD PRESSURE: 84 MMHG | BODY MASS INDEX: 45.36 KG/M2 | SYSTOLIC BLOOD PRESSURE: 130 MMHG | RESPIRATION RATE: 18 BRPM

## 2018-01-13 VITALS
RESPIRATION RATE: 20 BRPM | SYSTOLIC BLOOD PRESSURE: 146 MMHG | HEART RATE: 80 BPM | DIASTOLIC BLOOD PRESSURE: 70 MMHG | TEMPERATURE: 96.6 F | BODY MASS INDEX: 49.93 KG/M2 | WEIGHT: 247.2 LBS

## 2018-01-13 VITALS
RESPIRATION RATE: 18 BRPM | TEMPERATURE: 97.4 F | DIASTOLIC BLOOD PRESSURE: 82 MMHG | WEIGHT: 240 LBS | HEART RATE: 80 BPM | SYSTOLIC BLOOD PRESSURE: 138 MMHG | HEIGHT: 59 IN | BODY MASS INDEX: 48.38 KG/M2

## 2018-01-14 VITALS — DIASTOLIC BLOOD PRESSURE: 82 MMHG | TEMPERATURE: 97.3 F | SYSTOLIC BLOOD PRESSURE: 138 MMHG | HEART RATE: 82 BPM

## 2018-01-22 VITALS
RESPIRATION RATE: 16 BRPM | HEART RATE: 80 BPM | BODY MASS INDEX: 48.84 KG/M2 | HEIGHT: 59 IN | SYSTOLIC BLOOD PRESSURE: 138 MMHG | TEMPERATURE: 97.2 F | DIASTOLIC BLOOD PRESSURE: 88 MMHG | WEIGHT: 242.25 LBS

## 2018-01-23 VITALS
WEIGHT: 244.38 LBS | SYSTOLIC BLOOD PRESSURE: 138 MMHG | BODY MASS INDEX: 49.27 KG/M2 | HEIGHT: 59 IN | DIASTOLIC BLOOD PRESSURE: 84 MMHG | TEMPERATURE: 96.5 F | RESPIRATION RATE: 16 BRPM | HEART RATE: 82 BPM

## 2018-01-23 NOTE — RESULT NOTES
Verified Results  EKG/ECG- POC 08DRH6639 11:54AM Lisa Sensing     Test Name Result Flag Reference   EKG/ECG neg         Plan  Preoperative clearance    · EKG/ECG- POC; Status:Complete;   Done: 29YAJ5137 11:54AM

## 2018-02-07 DIAGNOSIS — E11.9 TYPE 2 DIABETES MELLITUS WITHOUT COMPLICATION, WITHOUT LONG-TERM CURRENT USE OF INSULIN (HCC): Primary | ICD-10-CM

## 2018-02-26 NOTE — CONSULTS
Chief Complaint  pt is here for pre-op clearance for cataract surgery being done by Queta Harding on 1-15-18, meds and allergies reviewed      History of Present Illness  Pre-Op Visit:   Surgical Risk Assessment:   Prior Anesthesia: She had prior anesthesia and no prior adverse reaction to general anesthesia  Pertinent Past Medical History: no angina, no arrythmia, no CAD, CAD without prior MI, CAD without recent PCI, no CHF, no chronic liver disease, no acute hepatitis, no secondary hypercoagulable state, diabetes, does not use insulin, no thyroid disease, no neck osteoarthrosis, no TMJ osteoarthrosis, does not wear dentures, no seizure disorder, no CVA, no asthma, no COPD, not DORIAN, no renal disease, no low serum albumin and obesity  Exercise Capacity: able to walk four blocks without symptoms and able to walk two flights of stairs without symptoms  Symptoms: no easy bleeding, no easy bruising, no frequent nosebleeds, no chest pain, no cough, no dyspnea, no edema, no palpitations and no wheezing  Additional DORIAN risk factors include high BMI and age over 48, but female gender and normal neck circumference  Pertinent Family History: no pertinent family history  Living Situation: home is secure and supportive and no post-op concerns with her living situation  HPI: 47-year-old female here for preop exam and clearance for left eye cataract surgery to be done on 1/15 by Dr Dea Opitz  patient states she is doing well overall  Review of Systems    Constitutional: No fever, no chills, feels well, no tiredness, no recent weight gain or weight loss  Eyes: as noted in HPI  Cardiovascular: No complaints of slow heart rate, no fast heart rate, no chest pain, no palpitations, no leg claudication, no lower extremity edema  Respiratory: No complaints of shortness of breath, no wheezing, no cough, no SOB on exertion, no orthopnea, no PND  Active Problems    1  Arthritis (716 90) (M19 90)   2   Carcinoma in situ (234 9) (D09 9)   3  Chronic back pain (724 5,338 29) (M54 9,G89 29)   4  Dermatophytosis of nail (110 1) (B35 1)   5  Diabetes mellitus (250 00) (E11 9)   6  Diabetes mellitus type II, controlled (250 00) (E11 9)   7  Elevated alkaline phosphatase level (790 5) (R74 8)   8  Hammertoe (735 4) (M20 40)   9  Heart murmur (785 2) (R01 1)   10  Hyperlipidemia (272 4) (E78 5)   11  Macular degeneration (362 50) (H35 30)   12  Obesity, morbid, BMI 40 0-49 9 (278 01) (E66 01)   13  Pre-op examination (V72 84) (Z01 818)   14  Psoriasis (696 1) (L40 9)    Past Medical History    · History of Candidiasis, cutaneous (112 3) (B37 2)   · History of Colon cancer screening (V76 51) (Z12 11)   · History of Encounter to establish care (V65 8) (Z76 89)   · History of screening mammography (V15 89) (Z92 89)   · History of shortness of breath (V13 89) (Z87 898)   · History of Impaired fasting glucose (790 21) (R73 01)    Surgical History    · History of Cholecystectomy   · History of Episiotomy   · History of Labioplasty   · History of Tonsillectomy   · History of Total Abdominal Hysterectomy With Bilateral Salpingo-Oophorectomy    Family History    · Family history of abdominal aortic aneurysm (AAA) (V17 49) (Z82 49)   · Family history of cerebrovascular accident (CVA) (V17 1) (Z82 3)   · Family history of malignant neoplasm of breast (V16 3) (Z80 3)    · Family history of myocardial infarction (V17 3) (Z82 49)    · Family history of CAD (coronary artery disease)   · Family history of abdominal aortic aneurysm (AAA) (V17 49) (Z82 49)    · Family history of cerebrovascular accident (CVA) (V17 1) (Z82 3)    · Family history of cerebrovascular accident (CVA) (V17 1) (Z82 3)    · Family history of malignant neoplasm of breast (V16 3) (Z80 3)    Social History    · Caffeine use (V49 89) (F15 90)   · 4 cans diet pepsi daily   · Female in homosexual marriage   · Never a smoker   · No alcohol use    Current Meds   1   Atorvastatin Calcium 10 MG Oral Tablet; Take 1 tablet daily  Requested for: 92NKT5905;   Last Rx:23Oct2017 Ordered   2  Daily Multivitamin TABS; TAKE 1 TABLET DAILY; Therapy: (Aristides Zimmerman) to Recorded   3  MetFORMIN HCl - 500 MG Oral Tablet; TAKE 1 TABLET TWICE DAILY WITH FOOD; Therapy: 82DCT2397 to (TFOJMFKB:47NKU7979)  Requested for: 53BBD4564; Last   ML:74MJY8293 Ordered   4  Nystatin 987562 UNIT/GM External Powder; APPLY 2-3 TIMES DAILY TO AFFECTED   AREA(S); Therapy: 57AZY2775 to (Last Rx:01Jun2017)  Requested for: 01Jun2017 Ordered   5  PreserVision AREDS Oral Capsule; TAKE AS DIRECTED; Therapy: 88HDT4351 to Recorded   6  Triamcinolone Acetonide 0 1 % External Cream; APPLY  AND RUB  IN A THIN FILM TO   AFFECTED AREAS TWICE DAILY  (AM AND PM); Therapy: (Aristides Zimmerman) to Recorded   7  Vitamin C 100 MG Oral Tablet; Therapy: 09SXG7290 to Recorded    The medication list was reviewed and updated today  Allergies    1  Adhesive Bandages MISC   2  sulfa    3  Shellfish    Vitals  Signs    Temperature: 96 5 F  Heart Rate: 82  Respiration: 16  Systolic: 430  Diastolic: 84  Height: 4 ft 11 in  Weight: 244 lb 6 oz  BMI Calculated: 49 36  BSA Calculated: 2 01    Physical Exam    Constitutional   General appearance: No acute distress, well appearing and well nourished  Eyes   Conjunctiva and lids: No swelling, erythema or discharge  Pupils and irises: Equal, round, reactive to light  Ears, Nose, Mouth, and Throat   Oropharynx: Normal with no erythema, edema, exudate or lesions  Neck   Neck: Supple, symmetric, trachea midline, no masses  Thyroid: Normal, no thyromegaly  Pulmonary   Respiratory effort: No increased work of breathing or signs of respiratory distress  Auscultation of lungs: Clear to auscultation  Cardiovascular   Auscultation of heart: Normal rate and rhythm, normal S1 and S2, no murmurs  Carotid pulses: 2+ bilaterally      Examination of extremities for edema and/or varicosities: Normal        Results/Data  EKG/ECG- POC 58YVD4133 11:54AM Sandrita Patel     Test Name Result Flag Reference   EKG/ECG neg         Assessment    1  Pre-op examination (V72 84) (Z01 818)   2  Preoperative clearance (V72 84) (Z01 818)   3  Left cataract (366 9) (H26 9)    Plan  Preoperative clearance    · EKG/ECG- POC; Status:Complete;   Done: 19GGG1432 11:54AM    Discussion/Summary  Surgical Clearance: She is at a LOW TO MODERATE risk from a cardiovascular standpoint at this time without any additional cardiac testing  Reevaluation needed, if she should present with symptoms prior to surgery/procedure  61-year-old female here for preop exam and clearance for left eye cataract surgery to be done on 1/15 by Dr Wilma Roper  Patient is low to moderate risk for low risk procedure  Patient is at acceptable risk  Benefits outweigh the risk  May proceed  The patient was counseled regarding diagnostic results, instructions for management, risk factor reductions, prognosis, patient and family education, impressions, risks and benefits of treatment options, importance of compliance with treatment  End of Encounter Meds    1  MetFORMIN HCl - 500 MG Oral Tablet; TAKE 1 TABLET TWICE DAILY WITH FOOD; Therapy: 38LKU2772 to (PKGFJEBK:31KDX3912)  Requested for: 23Oct2017; Last   FS:[id number] Ordered    2  Daily Multivitamin TABS; TAKE 1 TABLET DAILY; Therapy: (Nga Diego) to Recorded   3  Vitamin C 100 MG Oral Tablet; Therapy: 05FAU9559 to Recorded    4  Atorvastatin Calcium 10 MG Oral Tablet (Lipitor); Take 1 tablet daily  Requested for:   66EAM2253; Last Rx:23Oct2017 Ordered    5  PreserVision AREDS Oral Capsule; TAKE AS DIRECTED; Therapy: 00IZH2812 to Recorded    6  Nystatin 995855 UNIT/GM External Powder; APPLY 2-3 TIMES DAILY TO AFFECTED   AREA(S); Therapy: 83IRW6098 to (Last Rx:01Jun2017)  Requested for: 01Jun2017 Ordered    7   Triamcinolone Acetonide 0 1 % External Cream; APPLY  AND RUB  IN A THIN FILM TO   AFFECTED AREAS TWICE DAILY  (AM AND PM);    Therapy: (Cholo Quinteros) to Recorded    Signatures   Electronically signed by : Ariel Gold MD; Jan 11 2018  8:40AM EST                       (Author)

## 2018-03-23 ENCOUNTER — TRANSCRIBE ORDERS (OUTPATIENT)
Dept: LAB | Facility: CLINIC | Age: 83
End: 2018-03-23

## 2018-03-23 ENCOUNTER — APPOINTMENT (OUTPATIENT)
Dept: LAB | Facility: CLINIC | Age: 83
End: 2018-03-23
Payer: MEDICARE

## 2018-03-23 DIAGNOSIS — E78.5 HYPERLIPIDEMIA, UNSPECIFIED HYPERLIPIDEMIA TYPE: ICD-10-CM

## 2018-03-23 DIAGNOSIS — E11.9 DIABETES MELLITUS WITHOUT COMPLICATION (HCC): ICD-10-CM

## 2018-03-23 DIAGNOSIS — E11.9 DIABETES MELLITUS WITHOUT COMPLICATION (HCC): Primary | ICD-10-CM

## 2018-03-23 DIAGNOSIS — R74.8 ACID PHOSPHATASE ELEVATED: ICD-10-CM

## 2018-03-23 LAB
ALBUMIN SERPL BCP-MCNC: 3.5 G/DL (ref 3.5–5)
ALP SERPL-CCNC: 145 U/L (ref 46–116)
ALT SERPL W P-5'-P-CCNC: 21 U/L (ref 12–78)
ANION GAP SERPL CALCULATED.3IONS-SCNC: 8 MMOL/L (ref 4–13)
AST SERPL W P-5'-P-CCNC: 11 U/L (ref 5–45)
BASOPHILS # BLD AUTO: 0.03 THOUSANDS/ΜL (ref 0–0.1)
BASOPHILS NFR BLD AUTO: 0 % (ref 0–1)
BILIRUB SERPL-MCNC: 0.43 MG/DL (ref 0.2–1)
BUN SERPL-MCNC: 15 MG/DL (ref 5–25)
CALCIUM SERPL-MCNC: 9.2 MG/DL (ref 8.3–10.1)
CHLORIDE SERPL-SCNC: 104 MMOL/L (ref 100–108)
CO2 SERPL-SCNC: 26 MMOL/L (ref 21–32)
CREAT SERPL-MCNC: 0.87 MG/DL (ref 0.6–1.3)
EOSINOPHIL # BLD AUTO: 0.34 THOUSAND/ΜL (ref 0–0.61)
EOSINOPHIL NFR BLD AUTO: 3 % (ref 0–6)
ERYTHROCYTE [DISTWIDTH] IN BLOOD BY AUTOMATED COUNT: 12.9 % (ref 11.6–15.1)
EST. AVERAGE GLUCOSE BLD GHB EST-MCNC: 169 MG/DL
GFR SERPL CREATININE-BSD FRML MDRD: 63 ML/MIN/1.73SQ M
GLUCOSE P FAST SERPL-MCNC: 140 MG/DL (ref 65–99)
HBA1C MFR BLD: 7.5 % (ref 4.2–6.3)
HCT VFR BLD AUTO: 38.6 % (ref 34.8–46.1)
HGB BLD-MCNC: 12.5 G/DL (ref 11.5–15.4)
LYMPHOCYTES # BLD AUTO: 3.47 THOUSANDS/ΜL (ref 0.6–4.47)
LYMPHOCYTES NFR BLD AUTO: 29 % (ref 14–44)
MCH RBC QN AUTO: 30.4 PG (ref 26.8–34.3)
MCHC RBC AUTO-ENTMCNC: 32.4 G/DL (ref 31.4–37.4)
MCV RBC AUTO: 94 FL (ref 82–98)
MONOCYTES # BLD AUTO: 0.94 THOUSAND/ΜL (ref 0.17–1.22)
MONOCYTES NFR BLD AUTO: 8 % (ref 4–12)
NEUTROPHILS # BLD AUTO: 7.18 THOUSANDS/ΜL (ref 1.85–7.62)
NEUTS SEG NFR BLD AUTO: 60 % (ref 43–75)
NRBC BLD AUTO-RTO: 0 /100 WBCS
PLATELET # BLD AUTO: 305 THOUSANDS/UL (ref 149–390)
PMV BLD AUTO: 9.9 FL (ref 8.9–12.7)
POTASSIUM SERPL-SCNC: 4.1 MMOL/L (ref 3.5–5.3)
PROT SERPL-MCNC: 7.3 G/DL (ref 6.4–8.2)
RBC # BLD AUTO: 4.11 MILLION/UL (ref 3.81–5.12)
SODIUM SERPL-SCNC: 138 MMOL/L (ref 136–145)
TSH SERPL DL<=0.05 MIU/L-ACNC: 2.24 UIU/ML (ref 0.36–3.74)
WBC # BLD AUTO: 12.02 THOUSAND/UL (ref 4.31–10.16)

## 2018-03-23 PROCEDURE — 84443 ASSAY THYROID STIM HORMONE: CPT

## 2018-03-23 PROCEDURE — 85025 COMPLETE CBC W/AUTO DIFF WBC: CPT

## 2018-03-23 PROCEDURE — 80053 COMPREHEN METABOLIC PANEL: CPT

## 2018-03-23 PROCEDURE — 36415 COLL VENOUS BLD VENIPUNCTURE: CPT

## 2018-03-23 PROCEDURE — 83036 HEMOGLOBIN GLYCOSYLATED A1C: CPT

## 2018-03-26 ENCOUNTER — OFFICE VISIT (OUTPATIENT)
Dept: FAMILY MEDICINE CLINIC | Facility: CLINIC | Age: 83
End: 2018-03-26
Payer: MEDICARE

## 2018-03-26 VITALS
TEMPERATURE: 98.6 F | BODY MASS INDEX: 48.14 KG/M2 | RESPIRATION RATE: 18 BRPM | DIASTOLIC BLOOD PRESSURE: 80 MMHG | WEIGHT: 245.2 LBS | HEIGHT: 60 IN | SYSTOLIC BLOOD PRESSURE: 136 MMHG | HEART RATE: 88 BPM

## 2018-03-26 DIAGNOSIS — H26.9 CATARACT OF RIGHT EYE, UNSPECIFIED CATARACT TYPE: ICD-10-CM

## 2018-03-26 DIAGNOSIS — E11.9 TYPE 2 DIABETES MELLITUS WITHOUT COMPLICATION, WITHOUT LONG-TERM CURRENT USE OF INSULIN (HCC): ICD-10-CM

## 2018-03-26 DIAGNOSIS — R74.8 ELEVATED ALKALINE PHOSPHATASE LEVEL: ICD-10-CM

## 2018-03-26 DIAGNOSIS — Z00.00 ROUTINE HEALTH MAINTENANCE: ICD-10-CM

## 2018-03-26 DIAGNOSIS — Z01.818 PREOP EXAMINATION: Primary | ICD-10-CM

## 2018-03-26 DIAGNOSIS — D72.829 LEUKOCYTOSIS, UNSPECIFIED TYPE: ICD-10-CM

## 2018-03-26 DIAGNOSIS — Z01.818 PREOPERATIVE CLEARANCE: ICD-10-CM

## 2018-03-26 PROCEDURE — 99213 OFFICE O/P EST LOW 20 MIN: CPT | Performed by: FAMILY MEDICINE

## 2018-03-26 RX ORDER — ATORVASTATIN CALCIUM 10 MG/1
10 TABLET, FILM COATED ORAL DAILY
COMMUNITY
Start: 2017-03-30

## 2018-03-26 RX ORDER — CLOBETASOL PROPIONATE 0.5 MG/G
1 CREAM TOPICAL DAILY PRN
Refills: 4 | COMMUNITY
Start: 2018-02-20

## 2018-03-26 NOTE — PROGRESS NOTES
FAMILY PRACTICE OFFICE VISIT       NAME: Benito Gifford  AGE: [de-identified] y o  SEX: female       : 1935        MRN: [id number]    DATE: 3/27/2018  TIME: 8:02 PM    Assessment and Plan     Problem List Items Addressed This Visit     Preop examination - Primary     Here for preop exam and clearance for right eye cataract surgery to be done on  by Dr Hari Wolf  patient is at acceptable risk, benefits outweigh the risk  May proceed  Most recent EKG done on  with normal sinus rhythm  No acute ischemia noted  Patient was noted to have elevated WBC recently  Will repeat this  Patient did have a cold recently  Will repeat this  Preoperative clearance    Cataract of right eye    Type 2 diabetes mellitus without complication, without long-term current use of insulin (HCC)       Most recent A1c noted to be 7 5  Continue with metformin as well as lifestyle modifications  Up-to-date with ophthalmology and foot exams  Patient is followed by Dr Betsy Lozano  Relevant Orders    HEMOGLOBIN A1C W/ EAG ESTIMATION    Elevated alkaline phosphatase level      I am unsure why patient has elevated alkaline phosphatase level  I will repeat this, order ultrasound of liver  Patient does not have a gallbladder  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  If alkaline phosphatase is persistently elevated, will refer to GI for further evaluation  Patient is agreeable with this plan  Patient denies any abdominal symptoms or complaints  Relevant Orders    Alkaline phosphatase    Leukocytosis       Patient was noted to have elevated WBC  Noted on recent blood work  Patient states she did have a cold recently and started to feel better only 2 days ago  Will repeat this prior to surgery  Relevant Orders    CBC and differential    Routine health maintenance       No further colonoscopies as per Dr Isabella Frye  Will continue to obtain mammogram-  Followed by gyn  Declines DEXA scan  Declines vaccines  Patient is going to work on trying to lose weight  She will join [employer], start swimming this summer and purchase lean cuisine meals  There are no Patient Instructions on file for this visit  Chief Complaint     Chief Complaint   Patient presents with    Follow-up     chronic conditions: HLD, DM    Pre-op Exam    here for preop exam and clearance for right eye cataract surgery and for routine follow-up to discuss recent blood work results  History of Present Illness     HPI  Here for preop exam and clearance for right eye cataract surgery to be done on 04/09 by Dr Gillian Roper and to discuss recent blood work results  Patient states she is doing well overall  Patient states she had a recent cold and started to feel better a couple of days ago  Will goto  weight watchers, start swimming in the summer  Patient would like to lose weight  Will have colposcopy every 6 months  Just saw gyn, still has to get mammogram  No further colonosopcies as per dr Pantera Dill   Patient states she has seen Gi dr Kaci Sosa at [facility]? In the past   She is willing to go back if need be to further evaluate elevated alkaline phosphatase level  Review of Systems   Review of Systems   Constitutional: Negative for unexpected weight change  HENT: Negative  Eyes:          Right cataract   Respiratory: Negative for shortness of breath  Cardiovascular: Negative  Negative for chest pain, palpitations and leg swelling  Gastrointestinal: Negative for abdominal pain  Genitourinary: Negative for dysuria  Neurological: Negative for headaches  Hematological: Does not bruise/bleed easily         Active Problem List     Patient Active Problem List   Diagnosis    Preop examination    Preoperative clearance    Cataract of right eye    Type 2 diabetes mellitus without complication, without long-term current use of insulin (HCC)    Elevated alkaline phosphatase level    Leukocytosis    Routine health maintenance       Past Medical History:  Past Medical History:   Diagnosis Date    Shortness of breath     Last assessed 03/23/17       Past Surgical History:  Past Surgical History:   Procedure Laterality Date    CHOLECYSTECTOMY      LABIOPLASTY      OTHER SURGICAL HISTORY      Episiotomy    TONSILLECTOMY      TOTAL ABDOMINAL HYSTERECTOMY W/ BILATERAL SALPINGOOPHORECTOMY         Family History:  Family History   Problem Relation Age of Onset    Aortic aneurysm Mother      Abdominal    Other Mother      CVA    Breast cancer Mother     Heart attack Father     Coronary artery disease Sister     Aortic aneurysm Sister      Abdominal    Other Brother      CVA    Other Family      CVA    Breast cancer Family        Social History:  Social History     Social History    Marital status: /Civil Union     Spouse name: N/A    Number of children: N/A    Years of education: N/A     Occupational History    Not on file  Social History Main Topics    Smoking status: Never Smoker    Smokeless tobacco: Never Used    Alcohol use No    Drug use: Unknown    Sexual activity: Not on file     Other Topics Concern    Not on file     Social History Narrative    Caffeine use: 4 cans Diet Pepsi Daily        Female in Homosexual Marriage     I have reviewed the patient's medical history in detail; there are no changes to the history as noted in the electronic medical record  Objective     Vitals:    03/26/18 1435   BP: 136/80   Pulse: 88   Resp: 18   Temp: 98 6 °F (37 °C)     Wt Readings from Last 3 Encounters:   03/26/18 111 kg (245 lb 3 2 oz)   01/05/18 111 kg (244 lb 6 oz)   10/19/17 110 kg (242 lb 4 oz)       Physical Exam   Constitutional: She is oriented to person, place, and time  She appears well-developed and well-nourished  HENT:   Head: Normocephalic and atraumatic     Mouth/Throat: Oropharynx is clear and moist    Eyes: Conjunctivae and EOM are normal  Pupils are equal, round, and reactive to light  Neck: Normal range of motion  Neck supple  No thyromegaly present  Cardiovascular: Normal rate and regular rhythm  No murmur heard  Pulmonary/Chest: Effort normal and breath sounds normal    Abdominal: Soft  Bowel sounds are normal  She exhibits no distension  Musculoskeletal: Normal range of motion  She exhibits no edema  Lymphadenopathy:     She has no cervical adenopathy  Neurological: She is alert and oriented to person, place, and time  Skin: No rash noted  Psychiatric: She has a normal mood and affect  Nursing note and vitals reviewed        Pertinent Laboratory/Diagnostic Studies:  Lab Results   Component Value Date    GLUCOSE 141 (H) 02/23/2017    BUN 15 03/23/2018    CREATININE 0 87 03/23/2018    CALCIUM 9 2 03/23/2018     03/23/2018    K 4 1 03/23/2018    CO2 26 03/23/2018     03/23/2018     Lab Results   Component Value Date    ALT 21 03/23/2018    AST 11 03/23/2018    ALKPHOS 145 (H) 03/23/2018    BILITOT 0 43 03/23/2018       Lab Results   Component Value Date    WBC 12 02 (H) 03/23/2018    HGB 12 5 03/23/2018    HCT 38 6 03/23/2018    MCV 94 03/23/2018     03/23/2018       No results found for: TSH    Lab Results   Component Value Date    CHOL 194 10/16/2017     Lab Results   Component Value Date    TRIG 250 (H) 10/16/2017     Lab Results   Component Value Date    HDL 38 (L) 10/16/2017     Lab Results   Component Value Date    LDLCALC 106 (H) 10/16/2017     Lab Results   Component Value Date    HGBA1C 7 5 (H) 03/23/2018       Results for orders placed or performed in visit on 03/23/18   HEMOGLOBIN A1C W/ EAG ESTIMATION   Result Value Ref Range    Hemoglobin A1C 7 5 (H) 4 2 - 6 3 %     mg/dl   Comprehensive metabolic panel   Result Value Ref Range    Sodium 138 136 - 145 mmol/L    Potassium 4 1 3 5 - 5 3 mmol/L    Chloride 104 100 - 108 mmol/L    CO2 26 21 - 32 mmol/L    Anion Gap 8 4 - 13 mmol/L    BUN 15 5 - 25 mg/dL Creatinine 0 87 0 60 - 1 30 mg/dL    Glucose, Fasting 140 (H) 65 - 99 mg/dL    Calcium 9 2 8 3 - 10 1 mg/dL    AST 11 5 - 45 U/L    ALT 21 12 - 78 U/L    Alkaline Phosphatase 145 (H) 46 - 116 U/L    Total Protein 7 3 6 4 - 8 2 g/dL    Albumin 3 5 3 5 - 5 0 g/dL    Total Bilirubin 0 43 0 20 - 1 00 mg/dL    eGFR 63 ml/min/1 73sq m   TSH, 3rd generation with T4 reflex   Result Value Ref Range    TSH 3RD GENERATON 2 240 0 358 - 3 740 uIU/mL   CBC and differential   Result Value Ref Range    WBC 12 02 (H) 4 31 - 10 16 Thousand/uL    RBC 4 11 3 81 - 5 12 Million/uL    Hemoglobin 12 5 11 5 - 15 4 g/dL    Hematocrit 38 6 34 8 - 46 1 %    MCV 94 82 - 98 fL    MCH 30 4 26 8 - 34 3 pg    MCHC 32 4 31 4 - 37 4 g/dL    RDW 12 9 11 6 - 15 1 %    MPV 9 9 8 9 - 12 7 fL    Platelets 459 987 - 065 Thousands/uL    nRBC 0 /100 WBCs    Neutrophils Relative 60 43 - 75 %    Lymphocytes Relative 29 14 - 44 %    Monocytes Relative 8 4 - 12 %    Eosinophils Relative 3 0 - 6 %    Basophils Relative 0 0 - 1 %    Neutrophils Absolute 7 18 1 85 - 7 62 Thousands/µL    Lymphocytes Absolute 3 47 0 60 - 4 47 Thousands/µL    Monocytes Absolute 0 94 0 17 - 1 22 Thousand/µL    Eosinophils Absolute 0 34 0 00 - 0 61 Thousand/µL    Basophils Absolute 0 03 0 00 - 0 10 Thousands/µL       Orders Placed This Encounter   Procedures    CBC and differential    HEMOGLOBIN A1C W/ EAG ESTIMATION    Alkaline phosphatase       ALLERGIES:  Allergies   Allergen Reactions    Shellfish Allergy Shortness Of Breath     Action Taken: Lobster;     Sulfa Antibiotics Shortness Of Breath    Wound Dressing Adhesive Dermatitis     Action Taken: rash; Pulls pt's skin off       Current Medications     Current Outpatient Prescriptions   Medication Sig Dispense Refill    atorvastatin (LIPITOR) 10 mg tablet Take 10 mg by mouth daily      metFORMIN (GLUCOPHAGE) 500 mg tablet TAKE 1 TABLET TWICE DAILY WITH FOOD  180 tablet 1    clobetasol (TEMOVATE) 0 05 % cream Apply 1 application topically daily as needed  4     No current facility-administered medications for this visit  Health Maintenance     Health Maintenance   Topic Date Due    Depression Screening PHQ-9  11/07/1947    DTaP,Tdap,and Td Vaccines (1 - Tdap) 11/07/1958    Fall Risk  11/07/2002    Urinary Incontinence Screening  11/07/2002    PNEUMOCOCCAL POLYSACCHARIDE VACCINE AGE 65 AND OVER  11/07/2002    GLAUCOMA SCREENING 67+ YR  11/07/2004    INFLUENZA VACCINE  09/01/2017    URINE MICROALBUMIN  02/23/2018    Diabetic Foot Exam  10/06/2018    OPHTHALMOLOGY EXAM  10/06/2018       There is no immunization history on file for this patient      Tommy Siegel MD

## 2018-03-27 PROBLEM — E11.9 TYPE 2 DIABETES MELLITUS WITHOUT COMPLICATION, WITHOUT LONG-TERM CURRENT USE OF INSULIN (HCC): Status: ACTIVE | Noted: 2018-03-27

## 2018-03-27 PROBLEM — R74.8 ELEVATED ALKALINE PHOSPHATASE LEVEL: Status: ACTIVE | Noted: 2018-03-27

## 2018-03-27 PROBLEM — D72.829 LEUKOCYTOSIS: Status: ACTIVE | Noted: 2018-03-27

## 2018-03-27 PROBLEM — H26.9 CATARACT OF RIGHT EYE: Status: ACTIVE | Noted: 2018-03-27

## 2018-03-27 PROBLEM — Z01.818 PREOPERATIVE CLEARANCE: Status: ACTIVE | Noted: 2018-03-27

## 2018-03-27 PROBLEM — Z00.00 ROUTINE HEALTH MAINTENANCE: Status: ACTIVE | Noted: 2018-03-27

## 2018-03-27 PROBLEM — Z01.818 PREOP EXAMINATION: Status: ACTIVE | Noted: 2018-03-27

## 2018-03-27 NOTE — ASSESSMENT & PLAN NOTE
Most recent A1c noted to be 7 5  Continue with metformin as well as lifestyle modifications  Up-to-date with ophthalmology and foot exams  Patient is followed by Dr Manpreet Richardson

## 2018-03-27 NOTE — ASSESSMENT & PLAN NOTE
Patient was noted to have elevated WBC  Noted on recent blood work  Patient states she did have a cold recently and started to feel better only 2 days ago  Will repeat this prior to surgery

## 2018-03-27 NOTE — ASSESSMENT & PLAN NOTE
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

## 2018-03-27 NOTE — ASSESSMENT & PLAN NOTE
Here for preop exam and clearance for right eye cataract surgery to be done on 04/09 by Dr Juan Quinn  patient is at acceptable risk, benefits outweigh the risk  May proceed  Most recent EKG done on 01/05 with normal sinus rhythm  No acute ischemia noted  Patient was noted to have elevated WBC recently  Will repeat this  Patient did have a cold recently  Will repeat this

## 2018-03-28 NOTE — ASSESSMENT & PLAN NOTE
No further colonoscopies as per Dr Rollo Sever  Will continue to obtain mammogram-  Followed by gyn  Declines DEXA scan  Declines vaccines  Patient is going to work on trying to lose weight  She will join [employer], start swimming this summer and purchase lean [employer] meals

## 2018-04-02 ENCOUNTER — LAB (OUTPATIENT)
Dept: LAB | Facility: CLINIC | Age: 83
End: 2018-04-02
Payer: MEDICARE

## 2018-04-02 DIAGNOSIS — D72.829 LEUKOCYTOSIS, UNSPECIFIED TYPE: ICD-10-CM

## 2018-04-02 DIAGNOSIS — R74.8 ELEVATED ALKALINE PHOSPHATASE LEVEL: ICD-10-CM

## 2018-04-02 LAB
ALP SERPL-CCNC: 126 U/L (ref 46–116)
BASOPHILS # BLD AUTO: 0.03 THOUSANDS/ΜL (ref 0–0.1)
BASOPHILS NFR BLD AUTO: 0 % (ref 0–1)
EOSINOPHIL # BLD AUTO: 0.23 THOUSAND/ΜL (ref 0–0.61)
EOSINOPHIL NFR BLD AUTO: 2 % (ref 0–6)
ERYTHROCYTE [DISTWIDTH] IN BLOOD BY AUTOMATED COUNT: 12.6 % (ref 11.6–15.1)
HCT VFR BLD AUTO: 39.6 % (ref 34.8–46.1)
HGB BLD-MCNC: 12.9 G/DL (ref 11.5–15.4)
LYMPHOCYTES # BLD AUTO: 3.06 THOUSANDS/ΜL (ref 0.6–4.47)
LYMPHOCYTES NFR BLD AUTO: 28 % (ref 14–44)
MCH RBC QN AUTO: 30.2 PG (ref 26.8–34.3)
MCHC RBC AUTO-ENTMCNC: 32.6 G/DL (ref 31.4–37.4)
MCV RBC AUTO: 93 FL (ref 82–98)
MONOCYTES # BLD AUTO: 0.81 THOUSAND/ΜL (ref 0.17–1.22)
MONOCYTES NFR BLD AUTO: 7 % (ref 4–12)
NEUTROPHILS # BLD AUTO: 6.88 THOUSANDS/ΜL (ref 1.85–7.62)
NEUTS SEG NFR BLD AUTO: 63 % (ref 43–75)
NRBC BLD AUTO-RTO: 0 /100 WBCS
PLATELET # BLD AUTO: 290 THOUSANDS/UL (ref 149–390)
PMV BLD AUTO: 9.5 FL (ref 8.9–12.7)
RBC # BLD AUTO: 4.27 MILLION/UL (ref 3.81–5.12)
WBC # BLD AUTO: 11.07 THOUSAND/UL (ref 4.31–10.16)

## 2018-04-02 PROCEDURE — 85025 COMPLETE CBC W/AUTO DIFF WBC: CPT

## 2018-04-02 PROCEDURE — 84075 ASSAY ALKALINE PHOSPHATASE: CPT

## 2018-04-02 PROCEDURE — 36415 COLL VENOUS BLD VENIPUNCTURE: CPT

## 2018-04-07 ENCOUNTER — TELEPHONE (OUTPATIENT)
Dept: FAMILY MEDICINE CLINIC | Facility: CLINIC | Age: 83
End: 2018-04-07

## 2018-04-07 DIAGNOSIS — R74.8 ELEVATED ALKALINE PHOSPHATASE LEVEL: ICD-10-CM

## 2018-04-07 DIAGNOSIS — D72.829 LEUKOCYTOSIS, UNSPECIFIED TYPE: Primary | ICD-10-CM

## 2018-04-07 NOTE — TELEPHONE ENCOUNTER
----- Message from Erna Lancaster MD sent at 4/7/2018 10:50 AM EDT -----  Can you please let patient know that I received her blood work and her white count has improved from 12 to  11 and the normal is 10 1  I do not see a contraindication for her to proceed with her surgery  She may let Dr Valencia Cowart know as well  I would like to repeat her white count again in the next 1-2 weeks to ensure it is back to a normal range  In addition, her alkaline phosphatase/ liver enzyme has also improved from 145 to  126 and the normal is up to 116  I would like to keep an eye on this and repeat this again in the next 2 months  I will print this out    Thank you

## 2018-04-07 NOTE — PROGRESS NOTES
Can you please let patient know that I received her blood work and her white count has improved from 12 to  11 and the normal is 10 1  I do not see a contraindication for her to proceed with her surgery  She may let Dr Crow Talbert know as well  I would like to repeat her white count again in the next 1-2 weeks to ensure it is back to a normal range  In addition, her alkaline phosphatase/ liver enzyme has also improved from 145 to  126 and the normal is up to 116  I would like to keep an eye on this and repeat this again in the next 2 months  I will print this out    Thank you

## 2018-04-25 ENCOUNTER — TRANSITIONAL CARE MANAGEMENT (OUTPATIENT)
Dept: FAMILY MEDICINE CLINIC | Facility: CLINIC | Age: 83
End: 2018-04-25

## 2018-04-30 ENCOUNTER — OFFICE VISIT (OUTPATIENT)
Dept: FAMILY MEDICINE CLINIC | Facility: CLINIC | Age: 83
End: 2018-04-30
Payer: MEDICARE

## 2018-04-30 ENCOUNTER — APPOINTMENT (OUTPATIENT)
Dept: LAB | Facility: CLINIC | Age: 83
End: 2018-04-30
Payer: MEDICARE

## 2018-04-30 ENCOUNTER — TELEPHONE (OUTPATIENT)
Dept: FAMILY MEDICINE CLINIC | Facility: CLINIC | Age: 83
End: 2018-04-30

## 2018-04-30 VITALS
HEIGHT: 59 IN | TEMPERATURE: 95.9 F | WEIGHT: 240.2 LBS | BODY MASS INDEX: 48.42 KG/M2 | HEART RATE: 72 BPM | SYSTOLIC BLOOD PRESSURE: 138 MMHG | DIASTOLIC BLOOD PRESSURE: 80 MMHG | RESPIRATION RATE: 14 BRPM

## 2018-04-30 DIAGNOSIS — E87.5 HIGH POTASSIUM: ICD-10-CM

## 2018-04-30 DIAGNOSIS — E11.9 TYPE 2 DIABETES MELLITUS WITHOUT COMPLICATION, WITHOUT LONG-TERM CURRENT USE OF INSULIN (HCC): ICD-10-CM

## 2018-04-30 DIAGNOSIS — IMO0001 TRANSITION OF CARE PERFORMED WITH SHARING OF CLINICAL SUMMARY: Primary | ICD-10-CM

## 2018-04-30 DIAGNOSIS — Z95.0 S/P PLACEMENT OF CARDIAC PACEMAKER: ICD-10-CM

## 2018-04-30 DIAGNOSIS — E78.5 HYPERLIPIDEMIA, UNSPECIFIED HYPERLIPIDEMIA TYPE: ICD-10-CM

## 2018-04-30 PROBLEM — Z78.9 TRANSITION OF CARE PERFORMED WITH SHARING OF CLINICAL SUMMARY: Status: ACTIVE | Noted: 2018-04-30

## 2018-04-30 LAB
ANION GAP SERPL CALCULATED.3IONS-SCNC: 5 MMOL/L (ref 4–13)
BUN SERPL-MCNC: 18 MG/DL (ref 5–25)
CALCIUM SERPL-MCNC: 10 MG/DL (ref 8.3–10.1)
CHLORIDE SERPL-SCNC: 102 MMOL/L (ref 100–108)
CO2 SERPL-SCNC: 30 MMOL/L (ref 21–32)
CREAT SERPL-MCNC: 0.83 MG/DL (ref 0.6–1.3)
GFR SERPL CREATININE-BSD FRML MDRD: 67 ML/MIN/1.73SQ M
GLUCOSE SERPL-MCNC: 130 MG/DL (ref 65–140)
POTASSIUM SERPL-SCNC: 4.3 MMOL/L (ref 3.5–5.3)
SODIUM SERPL-SCNC: 137 MMOL/L (ref 136–145)

## 2018-04-30 PROCEDURE — 99496 TRANSJ CARE MGMT HIGH F2F 7D: CPT | Performed by: FAMILY MEDICINE

## 2018-04-30 PROCEDURE — 36415 COLL VENOUS BLD VENIPUNCTURE: CPT

## 2018-04-30 PROCEDURE — 80048 BASIC METABOLIC PNL TOTAL CA: CPT

## 2018-04-30 RX ORDER — VIT A/VIT C/VIT E/ZINC/COPPER 4296-226
CAPSULE ORAL DAILY
COMMUNITY
Start: 2017-02-25

## 2018-04-30 RX ORDER — ASCORBIC ACID 100 MG
TABLET,CHEWABLE ORAL DAILY
COMMUNITY
Start: 2017-02-25

## 2018-04-30 RX ORDER — CYANOCOBALAMIN (VITAMIN B-12) 500 MCG
TABLET ORAL
COMMUNITY

## 2018-04-30 RX ORDER — ACETAMINOPHEN 500 MG
500 TABLET ORAL EVERY 6 HOURS
COMMUNITY
Start: 2018-04-24

## 2018-04-30 RX ORDER — PREDNISOLONE ACETATE 10 MG/ML
SUSPENSION/ DROPS OPHTHALMIC
COMMUNITY
Start: 2018-03-22

## 2018-04-30 NOTE — ASSESSMENT & PLAN NOTE
Last lipid panel done in October, overall stable  Patient will continue with atorvastatin and lifestyle modifications

## 2018-04-30 NOTE — ASSESSMENT & PLAN NOTE
Upon review of blood work from [facility] visit yesterday, patient's potassium was noted to be minimally elevated  I would like to repeat this  Patient is agreeable  Will provide blood work Rx and notify patient of results

## 2018-04-30 NOTE — ASSESSMENT & PLAN NOTE
Patient is here for follow-up of recent hospital admission status post pacemaker placement for AV heart block  She is doing well overall however complains of some fatigue occasionally  She will keep her scheduled appointment with cardiologist, Dr Jaja Sommer for May 4th

## 2018-04-30 NOTE — ASSESSMENT & PLAN NOTE
Patient continues with metformin  Up-to-date with ophthalmology exam   Patient states her blood sugars have improved recently  Continue with lifestyle modifications as well  Up-to-date with foot exam by Podiatry, Dr Citlaly Arcos

## 2018-04-30 NOTE — TELEPHONE ENCOUNTER
----- Message from Guero Goyal MD sent at 4/30/2018 12:37 PM EDT -----  Can you please let patient know that her potassium is now within normal range  It was noted to be 4 3    Thank you

## 2018-04-30 NOTE — ASSESSMENT & PLAN NOTE
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  Patient presented with complaints fatigue and shortness of breath  She was found to be in second-degree AV block  Patient did receive a permanent pacemaker placed  Patient did overall with this however yesterday went to the ER as she was experiencing chest pressure Saturday night  ER recommended a stress test and admission however patient declined  She states she will keep her scheduled appointment with her cardiologist, Dr Huang for May 4th at which time she will discuss further testing  At present, denies chest pain, shortness of breath, lower extremity swelling  Incision area intact  No acute signs of infection noted

## 2018-04-30 NOTE — PROGRESS NOTES
FAMILY PRACTICE OFFICE VISIT       NAME: Yumi Louie  AGE: [de-identified] y o  SEX: female       : 1935        MRN: [id number]    DATE: 2018  TIME: 11:24 AM    Assessment and Plan     Problem List Items Addressed This Visit     Type 2 diabetes mellitus without complication, without long-term current use of insulin ([address] )       Patient continues with metformin  Up-to-date with ophthalmology exam   Patient states her blood sugars have improved recently  Continue with lifestyle modifications as well  Up-to-date with foot exam by Podiatry, Dr Attila Pickering  Transition of care performed with sharing of clinical summary - Primary     28-year-old female presents today for follow-up of recent hospital admission from  to  for bradycardia secondary to AV heart block s/p pacemaker placement  Patient presented with complaints fatigue and shortness of breath  She was found to be in second-degree AV block  Patient did receive a permanent pacemaker placed  Patient did overall with this however yesterday went to the ER as she was experiencing chest pressure Saturday night  ER recommended a stress test and admission however patient declined  She states she will keep her scheduled appointment with her cardiologist, Dr Carrie Jimenez for May 4th at which time she will discuss further testing  At present, denies chest pain, shortness of breath, lower extremity swelling  Incision area intact  No acute signs of infection noted  S/P placement of cardiac pacemaker       Patient is here for follow-up of recent hospital admission status post pacemaker placement for AV heart block  She is doing well overall however complains of some fatigue occasionally  She will keep her scheduled appointment with cardiologist, Dr Carrie Jimenez for May 4th  High potassium       Upon review of blood work from [facility] visit yesterday, patient's potassium was noted to be minimally elevated  I would like to repeat this    Patient is agreeable  Will provide blood work Rx and notify patient of results  Relevant Orders    Basic metabolic panel    Hyperlipidemia      Last lipid panel done in October, overall stable  Patient will continue with atorvastatin and lifestyle modifications  There are no Patient Instructions on file for this visit  Chief Complaint     Chief Complaint   Patient presents with    Transition of Care Management     Patient is here for a followup from the hospital for chest pain and a pacemaker placement  History of Present Illness     HPI   62-year-old female presents today for follow-up of recent hospital admission from 4/23 to 4/24 for bradycardia secondary to AV heart block s/p pacemaker placement  Patient presented with complaints fatigue and shortness of breath  She was found to be in second-degree AV block  Patient did receive a permanent pacemaker placed  Patient did overall with this however yesterday went to the ER as she was experiencing chest pressure Saturday night  ER recommended a stress test and admission however patient declined  She states she will keep her scheduled appointment with her cardiologist, Dr Jaja Sommer for May 4th at which time she will discuss further testing  At present, denies chest pain, shortness of breath, lower extremity swelling        Date and time hospital follow up call was made:  4/25/2018  4:03 PM  Hospital care reviewed:  Records reviewed  Patient [facility] hopsital[facility] at:  [facility]  Date of admission:  4/23/18  Date of discharge:  4/24/18  Diagnosis:  Bradycardia, AV Heart block  Were the patients medicaitons reviewed and updated:  Yes  Current symptoms:  None  Post hospital issues:  None  Should patient be enrolled in anticoag monitoring?:  No  Scheduled for follow up?:  Yes  Did you obtain your prescribed medications:  Yes  Do you need help managing your perscriptions or medications:  No  Is transportation to your appointments needed:  No  I have advised the patient to call PCP with any new or worsening symptoms (please type in name along with any credentials):  Esta Stage, CMA  Living Arrangements:  Spouse or Significiant other  Support System:  Spouse  The type of support provided:  Emotional, Physical  Do you have social support:  Yes, as much as I need  Are you recieving outpatient services:  No  Are you recieving home care services:  No  Are you using any community resources:  No  Current waiver service:  No  Counseling:  Patient  Counseling topics:  Importance of RX compliance  Comments:  Apt date : 04/30/2018 @ 8:30am           Review of Systems   Review of Systems   Constitutional: Negative for unexpected weight change  Eyes: Negative for visual disturbance  Pt had right cataract surgery recently which went well  Has an upcoming follow up appt with Dr Eligio Douglas   Respiratory: Negative for shortness of breath  Cardiovascular: Negative      Psychiatric/Behavioral:        Occasional anxiety       Active Problem List     Patient Active Problem List   Diagnosis    Preop examination    Preoperative clearance    Cataract of right eye    Type 2 diabetes mellitus without complication, without long-term current use of insulin (HCC)    Elevated alkaline phosphatase level    Leukocytosis    Routine health maintenance    Transition of care performed with sharing of clinical summary    S/P placement of cardiac pacemaker    High potassium    Hyperlipidemia       Past Medical History:  Past Medical History:   Diagnosis Date    Shortness of breath     Last assessed 03/23/17       Past Surgical History:  Past Surgical History:   Procedure Laterality Date    CHOLECYSTECTOMY      LABIOPLASTY      OTHER SURGICAL HISTORY      Episiotomy    TONSILLECTOMY      TOTAL ABDOMINAL HYSTERECTOMY W/ BILATERAL SALPINGOOPHORECTOMY         Family History:  Family History   Problem Relation Age of Onset    Aortic aneurysm Mother Abdominal    Other Mother      CVA    Breast cancer Mother     Heart attack Father     Coronary artery disease Sister     Aortic aneurysm Sister      Abdominal    Other Brother      CVA    Other Family      CVA    Breast cancer Family        Social History:  Social History     Social History    Marital status: /Civil Union     Spouse name: N/A    Number of children: N/A    Years of education: N/A     Occupational History    Not on file  Social History Main Topics    Smoking status: Never Smoker    Smokeless tobacco: Never Used    Alcohol use No    Drug use: Unknown    Sexual activity: Not on file     Other Topics Concern    Not on file     Social History Narrative    Caffeine use: 4 cans Diet Pepsi Daily        Female in Homosexual Marriage     I have reviewed the patient's medical history in detail; there are no changes to the history as noted in the electronic medical record  Objective     Vitals:    04/30/18 1114   BP: 138/80   Pulse:    Resp:    Temp:      Wt Readings from Last 3 Encounters:   04/30/18 109 kg (240 lb 3 2 oz)   03/26/18 111 kg (245 lb 3 2 oz)   01/05/18 111 kg (244 lb 6 oz)     Vitals:    04/30/18 0842 04/30/18 1114   BP: 146/92 138/80   Pulse: 72    Resp: 14    Temp: (!) 95 9 °F (35 5 °C)    TempSrc: Tympanic    Weight: 109 kg (240 lb 3 2 oz)    Height: 4' 10 5" (1 486 m)          Physical Exam   Constitutional: She is oriented to person, place, and time  She appears well-developed and well-nourished  HENT:   Head: Normocephalic and atraumatic  Mouth/Throat: Oropharynx is clear and moist    Eyes: Conjunctivae and EOM are normal  Pupils are equal, round, and reactive to light  Neck: Normal range of motion  Neck supple  Cardiovascular: Normal rate and regular rhythm  Murmur heard  2/6 ALTHEA   Pulmonary/Chest: Effort normal  She has no wheezes  Abdominal: Soft  Bowel sounds are normal    Musculoskeletal: Normal range of motion  She exhibits no edema  Lymphadenopathy:     She has no cervical adenopathy  Neurological: She is alert and oriented to person, place, and time  Skin: No erythema  Psychiatric: She has a normal mood and affect  Nursing note and vitals reviewed        Pertinent Laboratory/Diagnostic Studies:  Lab Results   Component Value Date    GLUCOSE 141 (H) 02/23/2017    BUN 15 03/23/2018    CREATININE 0 87 03/23/2018    CALCIUM 9 2 03/23/2018     03/23/2018    K 4 1 03/23/2018    CO2 26 03/23/2018     03/23/2018     Lab Results   Component Value Date    ALT 21 03/23/2018    AST 11 03/23/2018    ALKPHOS 126 (H) 04/02/2018    BILITOT 0 43 03/23/2018       Lab Results   Component Value Date    WBC 11 07 (H) 04/02/2018    HGB 12 9 04/02/2018    HCT 39 6 04/02/2018    MCV 93 04/02/2018     04/02/2018       No results found for: TSH    Lab Results   Component Value Date    CHOL 194 10/16/2017     Lab Results   Component Value Date    TRIG 250 (H) 10/16/2017     Lab Results   Component Value Date    HDL 38 (L) 10/16/2017     Lab Results   Component Value Date    LDLCALC 106 (H) 10/16/2017     Lab Results   Component Value Date    HGBA1C 7 5 (H) 03/23/2018       Results for orders placed or performed in visit on 04/02/18   CBC and differential   Result Value Ref Range    WBC 11 07 (H) 4 31 - 10 16 Thousand/uL    RBC 4 27 3 81 - 5 12 Million/uL    Hemoglobin 12 9 11 5 - 15 4 g/dL    Hematocrit 39 6 34 8 - 46 1 %    MCV 93 82 - 98 fL    MCH 30 2 26 8 - 34 3 pg    MCHC 32 6 31 4 - 37 4 g/dL    RDW 12 6 11 6 - 15 1 %    MPV 9 5 8 9 - 12 7 fL    Platelets 771 148 - 924 Thousands/uL    nRBC 0 /100 WBCs    Neutrophils Relative 63 43 - 75 %    Lymphocytes Relative 28 14 - 44 %    Monocytes Relative 7 4 - 12 %    Eosinophils Relative 2 0 - 6 %    Basophils Relative 0 0 - 1 %    Neutrophils Absolute 6 88 1 85 - 7 62 Thousands/µL    Lymphocytes Absolute 3 06 0 60 - 4 47 Thousands/µL    Monocytes Absolute 0 81 0 17 - 1 22 Thousand/µL Eosinophils Absolute 0 23 0 00 - 0 61 Thousand/µL    Basophils Absolute 0 03 0 00 - 0 10 Thousands/µL   Alkaline phosphatase   Result Value Ref Range    Alkaline Phosphatase 126 (H) 46 - 116 U/L       Orders Placed This Encounter   Procedures    Basic metabolic panel       ALLERGIES:  Allergies   Allergen Reactions    Shellfish Allergy Shortness Of Breath     Action Taken: Lobster;     Sulfa Antibiotics Shortness Of Breath    Wound Dressing Adhesive Dermatitis     Action Taken: rash; Pulls pt's skin off    Medical Tape        Current Medications     Current Outpatient Prescriptions   Medication Sig Dispense Refill    acetaminophen (TYLENOL) 500 mg tablet Take 500 mg by mouth every 6 (six) hours      Ascorbic Acid (VITAMIN C) 100 MG CHEW Chew      atorvastatin (LIPITOR) 10 mg tablet Take 10 mg by mouth daily      clobetasol (TEMOVATE) 0 05 % cream Apply 1 application topically daily as needed  4    Folic Acid 0 8 MG CAPS Take by mouth      metFORMIN (GLUCOPHAGE) 500 mg tablet TAKE 1 TABLET TWICE DAILY WITH FOOD  180 tablet 1    Multiple Vitamins-Minerals (DAILY MULTIVITAMIN PO) Take 1 tablet by mouth daily      Multiple Vitamins-Minerals (PRESERVISION AREDS) CAPS Take by mouth      prednisoLONE acetate (PRED FORTE) 1 % ophthalmic suspension        No current facility-administered medications for this visit  Health Maintenance     Health Maintenance   Topic Date Due    Depression Screening PHQ-9  11/07/1947    DTaP,Tdap,and Td Vaccines (1 - Tdap) 11/07/1958    Fall Risk  11/07/2002    Urinary Incontinence Screening  11/07/2002    PNEUMOCOCCAL POLYSACCHARIDE VACCINE AGE 65 AND OVER  11/07/2002    GLAUCOMA SCREENING 67+ YR  11/07/2004    INFLUENZA VACCINE  09/01/2017    URINE MICROALBUMIN  02/23/2018    HEMOGLOBIN A1C  09/23/2018    Diabetic Foot Exam  10/06/2018    OPHTHALMOLOGY EXAM  10/06/2018       There is no immunization history on file for this patient      Norris Pearson MD

## 2018-06-12 ENCOUNTER — TRANSCRIBE ORDERS (OUTPATIENT)
Dept: LAB | Facility: CLINIC | Age: 83
End: 2018-06-12

## 2018-06-12 ENCOUNTER — APPOINTMENT (OUTPATIENT)
Dept: LAB | Facility: CLINIC | Age: 83
End: 2018-06-12
Payer: MEDICARE

## 2018-06-12 DIAGNOSIS — E11.8 DIABETIC COMPLICATION (HCC): ICD-10-CM

## 2018-06-12 DIAGNOSIS — E78.2 MIXED HYPERLIPIDEMIA: ICD-10-CM

## 2018-06-12 DIAGNOSIS — E78.2 MIXED HYPERLIPIDEMIA: Primary | ICD-10-CM

## 2018-06-12 LAB
CHOLEST SERPL-MCNC: 194 MG/DL (ref 50–200)
CREAT UR-MCNC: 20 MG/DL
HDLC SERPL-MCNC: 37 MG/DL (ref 40–60)
LDLC SERPL CALC-MCNC: 97 MG/DL (ref 0–100)
MICROALBUMIN UR-MCNC: <5 MG/L (ref 0–20)
MICROALBUMIN/CREAT 24H UR: <25 MG/G CREATININE (ref 0–30)
NONHDLC SERPL-MCNC: 157 MG/DL
TRIGL SERPL-MCNC: 302 MG/DL

## 2018-06-12 PROCEDURE — 82043 UR ALBUMIN QUANTITATIVE: CPT

## 2018-06-12 PROCEDURE — 36415 COLL VENOUS BLD VENIPUNCTURE: CPT

## 2018-06-12 PROCEDURE — 82570 ASSAY OF URINE CREATININE: CPT

## 2018-06-12 PROCEDURE — 80061 LIPID PANEL: CPT

## 2018-07-03 ENCOUNTER — TRANSCRIBE ORDERS (OUTPATIENT)
Dept: ADMINISTRATIVE | Facility: HOSPITAL | Age: 83
End: 2018-07-03

## 2018-07-03 DIAGNOSIS — Z12.39 SCREENING BREAST EXAMINATION: Primary | ICD-10-CM

## 2018-07-31 ENCOUNTER — HOSPITAL ENCOUNTER (OUTPATIENT)
Dept: RADIOLOGY | Age: 83
Discharge: HOME | End: 2018-07-31
Payer: MEDICARE

## 2018-07-31 DIAGNOSIS — Z12.39 SCREENING BREAST EXAMINATION: ICD-10-CM

## 2018-07-31 PROCEDURE — 77063 BREAST TOMOSYNTHESIS BI: CPT

## 2018-07-31 PROCEDURE — 77067 SCR MAMMO BI INCL CAD: CPT

## 2019-07-17 ENCOUNTER — TRANSCRIBE ORDERS (OUTPATIENT)
Dept: ADMINISTRATIVE | Facility: HOSPITAL | Age: 84
End: 2019-07-17

## 2019-07-17 DIAGNOSIS — Z12.39 BREAST SCREENING, UNSPECIFIED: Primary | ICD-10-CM

## 2019-08-02 ENCOUNTER — HOSPITAL ENCOUNTER (OUTPATIENT)
Dept: RADIOLOGY | Age: 84
Discharge: HOME | End: 2019-08-02
Payer: COMMERCIAL

## 2019-08-02 VITALS — BODY MASS INDEX: 39.72 KG/M2 | HEIGHT: 59 IN | WEIGHT: 197 LBS

## 2019-08-02 DIAGNOSIS — Z12.39 BREAST SCREENING, UNSPECIFIED: ICD-10-CM

## 2019-08-02 PROCEDURE — 77067 SCR MAMMO BI INCL CAD: CPT

## 2019-08-02 PROCEDURE — 77063 BREAST TOMOSYNTHESIS BI: CPT

## 2020-02-04 LAB
LEFT EYE DIABETIC RETINOPATHY: NORMAL
RIGHT EYE DIABETIC RETINOPATHY: NORMAL

## 2020-07-21 LAB
LEFT EYE DIABETIC RETINOPATHY: NORMAL
RIGHT EYE DIABETIC RETINOPATHY: NORMAL

## 2020-08-05 ENCOUNTER — HOSPITAL ENCOUNTER (OUTPATIENT)
Dept: RADIOLOGY | Age: 85
Discharge: HOME | End: 2020-08-05
Payer: COMMERCIAL

## 2020-08-05 VITALS — BODY MASS INDEX: 36.29 KG/M2 | WEIGHT: 180 LBS | HEIGHT: 59 IN

## 2020-08-05 DIAGNOSIS — Z12.31 ENCOUNTER FOR SCREENING MAMMOGRAM FOR MALIGNANT NEOPLASM OF BREAST: ICD-10-CM

## 2020-08-05 PROCEDURE — 77063 BREAST TOMOSYNTHESIS BI: CPT

## 2020-08-05 PROCEDURE — 77067 SCR MAMMO BI INCL CAD: CPT

## 2020-12-16 LAB
LEFT EYE DIABETIC RETINOPATHY: NORMAL
RIGHT EYE DIABETIC RETINOPATHY: NORMAL

## 2021-02-10 LAB
LEFT EYE DIABETIC RETINOPATHY: NORMAL
RIGHT EYE DIABETIC RETINOPATHY: NORMAL

## 2022-02-15 ENCOUNTER — TELEPHONE (OUTPATIENT)
Dept: OBGYN CLINIC | Facility: HOSPITAL | Age: 87
End: 2022-02-15

## 2022-02-15 NOTE — TELEPHONE ENCOUNTER
Zora Russ called to request a change in her 3/3/22 10:45 appointment time because she has to take a 80year old friend to another doctors appointment  If possible to come in 8:am or earlier that would be great otherwise she needs to reschedule  Please call her [phone]

## 2022-05-18 ENCOUNTER — OFFICE VISIT (OUTPATIENT)
Dept: OBGYN CLINIC | Facility: CLINIC | Age: 87
End: 2022-05-18
Payer: COMMERCIAL

## 2022-05-18 VITALS
SYSTOLIC BLOOD PRESSURE: 126 MMHG | HEART RATE: 67 BPM | HEIGHT: 59 IN | WEIGHT: 194 LBS | DIASTOLIC BLOOD PRESSURE: 66 MMHG | BODY MASS INDEX: 39.11 KG/M2

## 2022-05-18 DIAGNOSIS — M65.332 TRIGGER MIDDLE FINGER OF LEFT HAND: Primary | ICD-10-CM

## 2022-05-18 PROCEDURE — 20550 NJX 1 TENDON SHEATH/LIGAMENT: CPT | Performed by: SURGERY

## 2022-05-18 PROCEDURE — 99204 OFFICE O/P NEW MOD 45 MIN: CPT | Performed by: SURGERY

## 2022-05-18 RX ORDER — DEXAMETHASONE SODIUM PHOSPHATE 100 MG/10ML
40 INJECTION INTRAMUSCULAR; INTRAVENOUS
Status: COMPLETED | OUTPATIENT
Start: 2022-05-18 | End: 2022-05-18

## 2022-05-18 RX ORDER — LIDOCAINE HYDROCHLORIDE 10 MG/ML
1 INJECTION, SOLUTION INFILTRATION; PERINEURAL
Status: COMPLETED | OUTPATIENT
Start: 2022-05-18 | End: 2022-05-18

## 2022-05-18 RX ADMIN — LIDOCAINE HYDROCHLORIDE 1 ML: 10 INJECTION, SOLUTION INFILTRATION; PERINEURAL at 10:10

## 2022-05-18 RX ADMIN — DEXAMETHASONE SODIUM PHOSPHATE 40 MG: 100 INJECTION INTRAMUSCULAR; INTRAVENOUS at 10:10
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
## 2022-05-18 NOTE — PATIENT INSTRUCTIONS
Trigger Finger    DESCRIPTION  Stenosing tenosynovitis is a condition commonly known as trigger finger   It is sometimes also called trigger thumb   The tendons that bend the fingers glide easily with the help of pulleys  These pulleys hold the tendons close to the bone  This is similar to how a line is held on a fishing sandy  Trigger finger occurs when the pulley becomes too thick, so the tendon cannot glide easily through it  CAUSES  Trigger fingers are more common with certain medical conditions such as rheumatoid arthritis, gout and diabetes  Repeated and strong gripping may lead to the condition  In most cases, the cause of the trigger finger is not known  SIGNS AND SYMPTOMS  Trigger finger may start with discomfort felt at the base of the finger or thumb, where the finger joins the palm  This area is often sensitive to pressure  You might feel a lump there  Other symptoms may include:    -Pain  -Popping  -Catching feeling  -Limited finger movement    TREATMENT  The goal of treatment in trigger finger is to eliminate the swelling and catching/locking, allowing full, painless movement of the finger or thumb  Common treatments include, but are not limited to:    Night splints  Anti-inflammatory medication  Changing your activity  Steroid injection    If non-surgical treatments do not relieve the symptoms, surgery may be recommended  The goal of surgery is to open the pulley at the base of the finger so that the tendon can glide more freely  The clicking or popping goes away first  Finger motion can return quickly, or there can be some stiffness after surgery  After either an injection or surgery, it is important to stretch the fingers (especially the PIP joint, or second knuckle) to prevent stiffness (See pictures below)

## 2022-07-06 ENCOUNTER — OFFICE VISIT (OUTPATIENT)
Dept: OBGYN CLINIC | Facility: CLINIC | Age: 87
End: 2022-07-06
Payer: COMMERCIAL

## 2022-07-06 VITALS
SYSTOLIC BLOOD PRESSURE: 114 MMHG | HEIGHT: 60 IN | RESPIRATION RATE: 16 BRPM | DIASTOLIC BLOOD PRESSURE: 70 MMHG | BODY MASS INDEX: 38.32 KG/M2 | HEART RATE: 70 BPM | WEIGHT: 195.2 LBS

## 2022-07-06 DIAGNOSIS — M65.332 TRIGGER MIDDLE FINGER OF LEFT HAND: Primary | ICD-10-CM

## 2022-07-06 PROCEDURE — 99213 OFFICE O/P EST LOW 20 MIN: CPT | Performed by: SURGERY

## 2022-07-06 RX ORDER — ATORVASTATIN CALCIUM 40 MG/1
40 TABLET, FILM COATED ORAL EVERY EVENING
COMMUNITY
Start: 2022-05-03

## 2022-07-06 NOTE — PROGRESS NOTES
ASSESSMENT/PLAN:      82 y o  female with a left long trigger finger  The patient is doing well  Trigger finger symptoms have resolved at this time  Hand/finger ROM is full  Activities to tolerance, no restrictions  If symptoms return or worsen, a 2nd trigger finger CSI may be performed  Follow up in the office as needed if symptoms worsen or fail to improve  The patient verbalized understanding of exam findings and treatment plan  We engaged in the shared decision-making process and treatment options were discussed at length with the patient  Surgical and conservative management discussed today along with risks and benefits  Diagnoses and all orders for this visit:    Trigger middle finger of left hand    Other orders  -     atorvastatin (LIPITOR) 40 mg tablet; Take 40 mg by mouth every evening      Follow Up:  Return if symptoms worsen or fail to improve  To Do Next Visit:  Re-evaluation of current issue    ____________________________________________________________________________________________________________________________________________      CHIEF COMPLAINT:  Chief Complaint   Patient presents with    Left Middle Finger - Follow-up, Locking, Pain       SUBJECTIVE:  Maribell Valentin is a 82y o  year old RHD female who presents to the office today for a follow up regarding a left long trigger finger  She was last seen in the office on 5/18/22, at which time she underwent an initial left long trigger finger CSI  She was also provided with a HEP  She states that the CSI and HEP has been beneficial for her  Her left long finger is no longer clicking, catching or locking  I have personally reviewed all the relevant PMH, PSH, SH, FH, Medications and allergies       PAST MEDICAL HISTORY:  Past Medical History:   Diagnosis Date    Shortness of breath     Last assessed 03/23/17       PAST SURGICAL HISTORY:  Past Surgical History:   Procedure Laterality Date    CARDIAC PACEMAKER PLACEMENT  CHOLECYSTECTOMY      LABIOPLASTY      OTHER SURGICAL HISTORY      Episiotomy    TONSILLECTOMY      TOTAL ABDOMINAL HYSTERECTOMY      age 72    TOTAL ABDOMINAL HYSTERECTOMY W/ BILATERAL SALPINGOOPHORECTOMY Bilateral     age 72       FAMILY HISTORY:  Family History   Problem Relation Age of Onset    Aortic aneurysm Mother         Abdominal    Other Mother         CVA    Breast cancer Mother 79    Heart attack Father     Coronary artery disease Sister     Aortic aneurysm Sister         Abdominal    Other Brother         CVA    Other Family         CVA    Breast cancer Family     Breast cancer Maternal Grandmother 76    Breast cancer Cousin 58    Breast cancer Cousin 62    Breast cancer additional onset Cousin 58    No Known Problems Maternal Grandfather     No Known Problems Paternal Grandmother     No Known Problems Paternal Grandfather     No Known Problems Sister     Leukemia Brother     Lung cancer Maternal Aunt         unknown age   [facility] Breast cancer Maternal Aunt     Breast cancer Maternal Aunt     No Known Problems Maternal Aunt     No Known Problems Maternal Aunt     No Known Problems Paternal Aunt        SOCIAL HISTORY:  Social History     Tobacco Use    Smoking status: Never Smoker    Smokeless tobacco: Never Used   Vaping Use    Vaping Use: Never used   Substance Use Topics    Alcohol use: No       MEDICATIONS:    Current Outpatient Medications:     acetaminophen (TYLENOL) 500 mg tablet, Take 500 mg by mouth every 6 (six) hours, Disp: , Rfl:     Ascorbic Acid (VITAMIN C) 100 MG CHEW, Chew in the morning, Disp: , Rfl:     atorvastatin (LIPITOR) 10 mg tablet, Take 10 mg by mouth daily, Disp: , Rfl:     atorvastatin (LIPITOR) 40 mg tablet, Take 40 mg by mouth every evening, Disp: , Rfl:     Calcium Ascorbate 500 MG TABS, Take 500 mg by mouth 2 (two) times a day, Disp: , Rfl:     Folic Acid 0 8 MG CAPS, Take by mouth, Disp: , Rfl:     Multiple Vitamins-Minerals (DAILY MULTIVITAMIN PO), Take 1 tablet by mouth daily, Disp: , Rfl:     Multiple Vitamins-Minerals (PRESERVISION AREDS) CAPS, Take by mouth daily, Disp: , Rfl:     clobetasol (TEMOVATE) 0 05 % cream, Apply 1 application topically daily as needed (Patient not taking: Reported on 7/6/2022), Disp: , Rfl: 4    erythromycin (ILOTYCIN) ophthalmic ointment, APPLY 1 APPLICATION IN RIGHT EYE NIGHTLY (Patient not taking: No sig reported), Disp: , Rfl: 0    metFORMIN (GLUCOPHAGE) 500 mg tablet, TAKE 1 TABLET TWICE DAILY WITH FOOD  (Patient not taking: Reported on 7/6/2022), Disp: 180 tablet, Rfl: 1    Multiple Vitamins-Minerals (PRESERVISION AREDS 2 PO), Take by mouth (Patient not taking: Reported on 7/6/2022), Disp: , Rfl:     prednisoLONE acetate (PRED FORTE) 1 % ophthalmic suspension, , Disp: , Rfl:     ALLERGIES:  Allergies   Allergen Reactions    Shellfish Allergy - Food Allergy Shortness Of Breath     Action Taken: Lobster;     Shellfish-Derived Products - Food Allergy Shortness Of Breath     Other reaction(s): Respiratory Distress  Lobster only  Other reaction(s): Respiratory Distress: Lobster    Sulfa Antibiotics Shortness Of Breath    Wound Dressing Adhesive Dermatitis     Action Taken: rash; Pulls pt's skin off    Medical Tape      Other reaction(s): Uticaria/Hives    Other      Lobster       REVIEW OF SYSTEMS:  Review of Systems   Constitutional: Negative for chills, fever and unexpected weight change  HENT: Negative for hearing loss, nosebleeds and sore throat  Eyes: Negative for pain, redness and visual disturbance  Respiratory: Negative for cough, shortness of breath and wheezing  Cardiovascular: Negative for chest pain, palpitations and leg swelling  Gastrointestinal: Negative for abdominal pain, nausea and vomiting  Endocrine: Negative for polydipsia and polyuria  Genitourinary: Negative for difficulty urinating and hematuria     Musculoskeletal: Negative for arthralgias, joint swelling and myalgias  Skin: Negative for rash and wound  Neurological: Negative for dizziness, numbness and headaches  Psychiatric/Behavioral: Negative for decreased concentration, dysphoric mood and suicidal ideas  The patient is not nervous/anxious  VITALS:  Vitals:    07/06/22 1006   BP: 114/70   Pulse: 70   Resp: 16       LABS:  HgA1c:   Lab Results   Component Value Date    HGBA1C 5 9 (H) 08/02/2021     BMP:   Lab Results   Component Value Date    CALCIUM 10 0 04/30/2018    K 4 3 04/30/2018    CO2 30 04/30/2018     04/30/2018    BUN 18 04/30/2018    CREATININE 0 83 04/30/2018       _____________________________________________________  PHYSICAL EXAMINATION:  General: well developed and well nourished, alert, oriented times 3 and appears comfortable  Psychiatric: Normal  HEENT: Normocephalic, Atraumatic Trachea Midline, No torticollis  Pulmonary: No audible wheezing or respiratory distress   Cardiovascular: No pitting edema, 2+ radial pulse   Abdominal/GI: abdomen non tender, non distended   Skin: No masses, erythema, lacerations, fluctation, ulcerations  Neurovascular: Sensation Intact to the Median, Ulnar, Radial Nerve, Motor Intact to the Median, Ulnar, Radial Nerve and Pulses Intact  Musculoskeletal: Normal, except as noted in detailed exam and in HPI  MUSCULOSKELETAL EXAMINATION:    left long finger:  Negative palpable nodule over the A1 pulley  Negative tenderness to palpation over A1 pulley  Negative catching  Negative clicking   Full composite fist  Brisk capillary refill      ___________________________________________________  STUDIES REVIEWED:  No new imaging to review           PROCEDURES PERFORMED:  Procedures  No Procedures performed today    _____________________________________________________      Ortiz Blinks    I,:  Sherin Finn am acting as a scribe while in the presence of the attending physician :       I,:  Leonie Redmond MD personally performed the services described in this documentation    as scribed in my presence :

## 2022-07-13 ENCOUNTER — NEW PATIENT COMPREHENSIVE (OUTPATIENT)
Dept: URBAN - METROPOLITAN AREA CLINIC 6 | Facility: CLINIC | Age: 87
End: 2022-07-13

## 2022-07-13 DIAGNOSIS — Z96.1: ICD-10-CM

## 2022-07-13 DIAGNOSIS — H40.1120: ICD-10-CM

## 2022-07-13 DIAGNOSIS — H35.3232: ICD-10-CM

## 2022-07-13 DIAGNOSIS — E11.9: ICD-10-CM

## 2022-07-13 PROCEDURE — 76514 ECHO EXAM OF EYE THICKNESS: CPT

## 2022-07-13 PROCEDURE — 92015 DETERMINE REFRACTIVE STATE: CPT

## 2022-07-13 PROCEDURE — 92133 CPTRZD OPH DX IMG PST SGM ON: CPT

## 2022-07-13 PROCEDURE — 99204 OFFICE O/P NEW MOD 45 MIN: CPT

## 2022-07-13 PROCEDURE — 92134 CPTRZ OPH DX IMG PST SGM RTA: CPT

## 2022-07-13 ASSESSMENT — TONOMETRY
OS_IOP_MMHG: 25
OS_IOP_MMHG: 25
OD_IOP_MMHG: 23
OD_IOP_MMHG: 25

## 2022-07-13 ASSESSMENT — PACHYMETRY
OS_CT_UM: 521
OD_CT_UM: 515

## 2022-07-13 ASSESSMENT — VISUAL ACUITY
OS_CC: 20/30-1
OD_CC: 20/40-1
OU_CC: J1-2

## 2022-11-14 ENCOUNTER — FOLLOW UP (OUTPATIENT)
Dept: URBAN - METROPOLITAN AREA CLINIC 6 | Facility: CLINIC | Age: 87
End: 2022-11-14

## 2022-11-14 DIAGNOSIS — H40.1120: ICD-10-CM

## 2022-11-14 DIAGNOSIS — E11.9: ICD-10-CM

## 2022-11-14 DIAGNOSIS — Z96.1: ICD-10-CM

## 2022-11-14 PROCEDURE — 92012 INTRM OPH EXAM EST PATIENT: CPT

## 2022-11-14 ASSESSMENT — VISUAL ACUITY
OS_CC: 20/40-2
OD_CC: 20/60

## 2022-11-14 ASSESSMENT — TONOMETRY
OD_IOP_MMHG: 18
OS_IOP_MMHG: 22

## 2023-04-26 ENCOUNTER — TELEPHONE (OUTPATIENT)
Dept: DERMATOLOGY | Age: 88
End: 2023-04-26

## 2023-05-04 ENCOUNTER — OFFICE VISIT (OUTPATIENT)
Age: 88
End: 2023-05-04

## 2023-05-04 VITALS — HEIGHT: 59 IN | WEIGHT: 199.6 LBS | BODY MASS INDEX: 40.24 KG/M2 | TEMPERATURE: 96.7 F

## 2023-05-04 DIAGNOSIS — D23.5 DILATED PORE OF WINER OF BACK: ICD-10-CM

## 2023-05-04 DIAGNOSIS — L81.4 SOLAR LENTIGO: ICD-10-CM

## 2023-05-04 DIAGNOSIS — L90.0 LICHEN SCLEROSUS: ICD-10-CM

## 2023-05-04 DIAGNOSIS — L30.4 INTERTRIGO: Primary | ICD-10-CM

## 2023-05-04 DIAGNOSIS — L57.0 ACTINIC KERATOSIS: ICD-10-CM

## 2023-05-04 RX ORDER — NYSTATIN 100000 [USP'U]/G
POWDER TOPICAL 2 TIMES DAILY
Qty: 60 G | Refills: 3 | Status: SHIPPED | OUTPATIENT
Start: 2023-05-04 | End: 2023-06-03

## 2023-05-04 RX ORDER — LISINOPRIL 5 MG/1
TABLET ORAL
COMMUNITY
Start: 2023-03-27

## 2023-05-04 RX ORDER — LISINOPRIL 5 MG/1
5 TABLET ORAL DAILY
COMMUNITY
Start: 2023-03-27

## 2023-05-04 RX ORDER — PIMECROLIMUS 10 MG/G
CREAM TOPICAL
COMMUNITY
Start: 2023-01-23

## 2023-05-04 RX ORDER — BRIMONIDINE TARTRATE 2 MG/ML
SOLUTION/ DROPS OPHTHALMIC
COMMUNITY
Start: 2023-01-28

## 2023-05-04 RX ORDER — HYDROXYZINE HYDROCHLORIDE 10 MG/1
TABLET, FILM COATED ORAL
COMMUNITY
Start: 2023-01-23

## 2023-05-04 RX ORDER — FOLIC ACID 20 MG
CAPSULE ORAL DAILY
COMMUNITY

## 2023-05-04 RX ORDER — CLOBETASOL PROPIONATE 0.5 MG/G
CREAM TOPICAL
Qty: 60 G | Refills: 1 | Status: SHIPPED | OUTPATIENT
Start: 2023-05-04

## 2023-05-04 NOTE — PROGRESS NOTES
"[facility] Note     Patient Name: Mateo Avelar  Encounter Date: 05/04/2023     Have you been cared for by a [facility] in the last 3 years and, if so, which description applies to you? NO  I am considered a \"new\" patient and must complete all patient intake questions  I am FEMALE/of child-bearing potential     REVIEW OF SYSTEMS:  Have you recently had or currently have any of the following? · Recent fever or chills? No  · Any non-healing wound? No  · Are you pregnant or planning to become pregnant? No  · Are you currently or planning to be nursing or breast feeding? No   PAST MEDICAL HISTORY:  Have you personally ever had or currently have any of the following? If \"YES,\" then please provide more detail  · Skin cancer (such as Melanoma, Basal Cell Carcinoma, Squamous Cell Carcinoma? No  · Tuberculosis, HIV/AIDS, Hepatitis B or C: No  · Systemic Immunosuppression such as Diabetes, Biologic or Immunotherapy, Chemotherapy, Organ Transplantation, Bone Marrow Transplantation No  · Radiation Treatment No   FAMILY HISTORY:  Any \"first degree relatives\" (parent, brother, sister, or child) with the following?  Skin Cancer, Pancreatic or Other Cancer? YES, breast cancer mother , maternal grandmother, maternal aunt and brother with leukemia    PATIENT EXPERIENCE:     Do you want the Dermatologist to perform a COMPLETE skin exam today including a clinical examination under the \"bra and underwear\" areas? NO   If necessary, do we have your permission to call and leave a detailed message on your Preferred Phone number that includes your specific medical information?   Yes      Allergies   Allergen Reactions    Shellfish Allergy - Food Allergy Shortness Of Breath     Action Taken: Lobster;     Shellfish-Derived Products - Food Allergy Shortness Of Breath     Other reaction(s): Respiratory Distress  Lobster only  Other reaction(s): Respiratory Distress: Lobster    Sulfa Antibiotics " Shortness Of Breath    Wound Dressing Adhesive Dermatitis     Action Taken: rash; Pulls pt's skin off    Medical Tape      Other reaction(s): Uticaria/Hives    Other      Lobster      Current Outpatient Medications:     hydrOXYzine HCL (ATARAX) 10 mg tablet, 1/2 tab po at bedtime for itching, Disp: , Rfl:     lisinopril (ZESTRIL) 5 mg tablet, Take 5 mg by mouth daily, Disp: , Rfl:     pimecrolimus (ELIDEL) 1 % cream, apply by topical route 2 times every day a thin layer to the affected area(s) ; rub in gently and completely x 6 week, Disp: , Rfl:     acetaminophen (TYLENOL) 500 mg tablet, Take 500 mg by mouth every 6 (six) hours, Disp: , Rfl:     Ascorbic Acid (VITAMIN C) 100 MG CHEW, Chew in the morning, Disp: , Rfl:     atorvastatin (LIPITOR) 10 mg tablet, Take 10 mg by mouth daily, Disp: , Rfl:     atorvastatin (LIPITOR) 40 mg tablet, Take 40 mg by mouth every evening, Disp: , Rfl:     brimonidine tartrate 0 2 % ophthalmic solution, INSTILL 1 DROP IN THE LEFT EYE TWO TIMES DAILY, Disp: , Rfl:     Calcium Ascorbate 500 MG TABS, Take 500 mg by mouth 2 (two) times a day, Disp: , Rfl:     clobetasol (TEMOVATE) 0 05 % cream, Apply 1 application topically daily as needed (Patient not taking: Reported on 7/6/2022), Disp: , Rfl: 4    erythromycin (ILOTYCIN) ophthalmic ointment, APPLY 1 APPLICATION IN RIGHT EYE NIGHTLY (Patient not taking: No sig reported), Disp: , Rfl: 0    Folic Acid 0 8 MG CAPS, Take by mouth, Disp: , Rfl:     Folic Acid 20 MG CAPS, Take by mouth daily, Disp: , Rfl:     lisinopril (ZESTRIL) 5 mg tablet, , Disp: , Rfl:     metFORMIN (GLUCOPHAGE) 500 mg tablet, TAKE 1 TABLET TWICE DAILY WITH FOOD  (Patient not taking: Reported on 7/6/2022), Disp: 180 tablet, Rfl: 1    Multiple Vitamins-Minerals (DAILY MULTIVITAMIN PO), Take 1 tablet by mouth daily, Disp: , Rfl:     Multiple Vitamins-Minerals (PRESERVISION AREDS 2 PO), Take by mouth (Patient not taking: Reported on 7/6/2022), Disp: , Rfl:     Multiple Vitamins-Minerals (PRESERVISION AREDS) CAPS, Take by mouth daily, Disp: , Rfl:     prednisoLONE acetate (PRED FORTE) 1 % ophthalmic suspension, , Disp: , Rfl:            Whom besides the patient is providing clinical information about today's encounter?   o NO ADDITIONAL HISTORIAN (patient alone provided history)  o Parent/Guardian provided history (due to age/developmental stage of patient)    Physical Exam and Assessment/Plan by Diagnosis:    ACTINIC KERATOSIS   Physical Exam:   Anatomic Location Affected: Face    Morphological Description:  Pink scaly papules    Pertinent Positives:   Pertinent Negatives: Additional History of Present Condition:  Here for skin     Assessment and Plan:  Based on a thorough discussion of this condition and the management approach to it (including a comprehensive discussion of the known risks, side effects and potential benefits of treatment), the patient (family) agrees to implement the following specific plan:   Patient will hold off until the fall to treat    Appointment scheduled     LICHEN SCLEROSUS ATROPHICUS    Physical Exam:   Anatomic Location Affected:  Vaginal area, labia minora   Morphological Description:  White patches with atrophy   Pertinent Positives:   Pertinent Negatives: Additional History of Present Condition:  Prescribed clobetasol cream to use by gyno which seems to have helped with itching     Assessment and Plan:  Based on a thorough discussion of this condition and the management approach to it (including a comprehensive discussion of the known risks, side effects and potential benefits of treatment), the patient (family) agrees to implement the following specific plan:   Continue clobetasol cream 0 05% twice a day topically for 2 weeks then as needed for itching     Lichen sclerosus is a chronic inflammatory skin disorder that most often affects the genital and perianal areas   It is more common in women before puberty "or after menopause  It is rare in males, but when present is called \"balanitis xerotica obliterans  \"  Lichen sclerosus can start at any age, although it is most often diagnosed in women over 48  Pre-pubertal children can also be affected   Lichen sclerosis is 10 times more common in women than in men   15% of patients know of a family member with lichen sclerosis   It may follow or co-exist with another skin condition, most often lichen simplex, psoriasis, erosive lichen planus, vitiligo or morphea   People with lichen sclerosus often have a personal or family history of other autoimmune disease such as thyroid disease (about 20% of patients), pernicious anaemia, or alopecia areata  What causes lichen sclerosus? The cause of lichen sclerosus is not fully understood, and may include genetic, hormonal, irritant, traumatic and infectious components  Lichen sclerosis is often classified as an autoimmune disease  Autoimmune disorders arise when the body's natural defenses against foreign or invading organisms (e g , antibodies) begin to attack healthy tissue for unknown reasons   Antibodies to other unknown proteins may account for other cases, explaining differing presentations of lichen sclerosis and response to treatment   However, these antibodies could be epigenetic, ie, the results of disease rather than the cause of disease   Some scientists believe that a genetic predisposition to lichen sclerosus exists  A genetic predisposition means that a person may carry a gene for a disease but it may not be expressed unless something in the environment triggers the disease  Other researchers believe that hormonal, irritant and/or infectious factors (or a combination of these) cause this skin condition  Cases where lichen sclerosus appears on skin after it has been damaged (from an injury or trauma) have been reported  What are the clinical features of lichen sclerosus?   Lichen sclerosus usually " affects the external genitalia (vulva or penis) and/or the area around the anus (perianal region)  Sometimes, it is accompanied by intense (intractable) itching, burning, and pain  If the disease is severe, even minor abrasions or chaffing can cause bleeding, tearing, and blistering  The scarring that results from untreated lichen sclerosis produces problems with urination, defecation, and intercourse  The presence of thin, easily irritated, and torn skin affects physical activity and clothing choice  For children with lichen sclerosus affecting the perianal region, constipation may be among the first signs of the presence of the disease  Lichen sclerosus is much more likely to affect males that have not been circumcised than males that have been  Rarely, lichen sclerosus can also affect other areas of the skin such as the breast, wrists, shoulder, neck, back, thigh, and the mouth  Skin tissue often becomes thin, shiny, wrinkled and parchment-like  Fissures, cracks, and purplish patches (ecchymoses) appear frequently  The earliest areas of lichen sclerosis exhibit a porcelain white appearing center surrounded by redness  This grows together to form larger areas of lichen sclerosus  The areas that are prone to rubbing and friction can develop blisters or bruising  The long term result of lichen sclerosus are areas of shiny, thin skin that has a tendency to be dry, crack, or bleed  This also produces loss of the normal parts of the external genitals, narrowing of the opening of the urethra/vagina/anus, and phimosis (inability to retract the foreskin) in men  The presence of non-healing ulcers or raised ulcerated areas in the external genitalia of women raises suspicion for the development of squamous cell carcinoma  In males, lichen sclerosus most commonly affects the foreskin of the penis, although it may affect other areas of the body  The opening at the end of the foreskin may become narrow and scarred  Discoloration and skin changes may also occur  Symptoms also include itching, soreness, and painful erections  In men, involvement in the perineal area is rare  In some rare cases, skin lesions may also develop in the mouth  The lesions consist of bluish-white flat irregular patchy areas on the inside of the cheeks and/or palate  The tongue, lips, and gums may also be involved  How do we diagnose lichen sclerosus atrophicus? Lichen sclerosus is diagnosed by looking at the skin affected  All those affected require a thorough clinical evaluation, identification of characteristic physical features, and a detailed patient history  A biopsy may be needed to confirm diagnosis  Biopsies may also be performed if squamous cell carcinoma is suspected  How do we treat lichen sclerosis? General measures for genital lichen sclerosis   Wash gently once or twice daily   Use a non-soap cleanser, if any   Try to avoid tight clothing, rubbing and scratching   Activities such as riding a bicycle or horse may aggravate symptoms   If incontinent, seek medical advice and treatment   Apply emollients to relieve dryness and itching, and as a barrier to protect sensitive skin in genital and anal areas from contact with urine and feces  Topical steroids are the main treatment for lichen sclerosus  An ultrapotent topical steroid is often prescribed, eg clobetasol propionate 0 05%  A potent topical steroid, eg mometasone furoate 0 1% ointment, may also be used in mild disease or when symptoms are controlled   An ointment base is less likely than cream to sting or to cause contact dermatitis   A thin smear should be precisely applied to the white plaques and rubbed in gently   Most patients will be told to apply the steroid ointment once a day  After one to three months (depending on the severity of the disease), the ointment can be used less often     Topical steroid may need to be continued once or twice a week to control symptoms or to prevent lichen sclerosis recurring   Itch often settles within a few days but it may take weeks to months for the skin to return to normal (if at all)   One 30-g tube of topical steroid should last 3 to 6 months or longer  It is most important to follow instructions carefully and to attend follow-up appointments regularly  Other topical treatments used in patients with lichen sclerosis include:   Intravaginal estrogen cream or pessaries in postmenopausal women  These reduce symptoms due to atrophic vulvovaginitis (dry, thin, fissured and sensitive vulval and vaginal tissues due to hormonal deficiency)   Topical calcineurin inhibitors tacrolimus ointment and pimecrolimus cream instead of or in addition to topical steroids  They tend to cause burning discomfort (at least for the first few days)  Early concern that these medications may have the potential to accelerate cancer growth in the presence of oncogenic human papilloma virus (the cause of genital warts) appears unfounded   Topical retinoid (eg tretinoin cream) is not well tolerated on genital skin but may be applied to other sites affected by lichen sclerosis  It reduces scaling and dryness  Oral medications: when severe, acute, and not responding to topical therapy, systemic treatment may rarely be prescribed  Options include:   Intralesional or systemic corticosteroids    Oral retinoids: acitretin, isotretinoin   Methotrexate   Ciclosporin    Surgery: is essential for high-grade squamous intraepithelial lesions or cancer  In males, circumcision is effective in lichen sclerosus affecting prepuce and glans of the penis  It is best done early if initial topical steroids have not controlled symptoms and signs  If the urethra is stenosed or scarred, reconstructive surgery may be necessary  Unfortunately, lichen sclerosus sometimes closes up the vaginal opening again after surgery has initially appeared successful  It can be repeated  Other reported treatments for lichen sclerosus are considered experimental at this time   CO2 laser ablation of hyperkeratotic plaques   Phototherapy   Photodynamic therapy   Fat injections   Stem cell and platelet-rich plasma injections    INTERTRIGO    Physical Exam:   Anatomic Location Affected:  Lower abdomen area    Morphological Description:  Moist    Pertinent Positives:   Pertinent Negatives: Additional History of Present Condition:  Applying nystatin powder and working well  Would like refill     Assessment and Plan:  Based on a thorough discussion of this condition and the management approach to it (including a comprehensive discussion of the known risks, side effects and potential benefits of treatment), the patient (family) agrees to implement the following specific plan:   Continue nystatin powder topically as needed to abdomen area     Assessment and Plan  Intertrigo describes a rash in the flexures or body folds, such as behind the ears, in the folds of the neck, under the arms (axillae), under a protruding abdomen, in the groin, between the buttocks, in the finger webs or toe spaces  Although intertrigo may affect one skin fold, it is common for it to involve multiple sites  Intertrigo can affect males and females of any age  It is particularly common in people that are overweight or obese (see metabolic syndrome)  Other contributing factors are:   Genetic tendency to skin disease   Hyperhidrosis (excessive sweating)    Intertrigo can be acute (recent onset), relapsing (recurrent), or chronic (present for more than 6 weeks)  The exact appearance and behaviour depends on the underlying cause or causes  The skin affected by intertrigo is inflamed, ie reddened and uncomfortable  It may become moist and macerated, leading to fissuring (cracks) and peeling  Intertrigo is due to genetic and environmental factors     Flexural skin has relatively high surface temperature   Moisture from insensible water loss and sweating cannot evaporate due to occlusion   Friction from movement of adjacent skin results in chafing  The microorganisms that are normally resident on flexural skin, the microbiome, include corynebacteria, other bacteria and yeasts  These multiply in warm moist environments and may cause disease  We can classify intertrigo into infectious and inflammatory origin but there is often overlap   Infections tend to be unilateral and asymmetrical    Inflammatory disorders tend to be symmetrical affecting armpits, groins, under the breasts and the abdominal folds, except atopic dermatitis, which more often arises on the neck, and in elbow and knee creases  Investigations may be necessary to determine the cause of intertrigo   A swab for microscopy and culture of bacteria (microbiology)   A scraping for microscopy and culture of fungi (mycology)   A skin biopsy may be performed for histopathology if the skin condition is unusual or fails to respond to treatment  What is the treatment for intertrigo? Treatment depends on the underlying cause, if identified, and on which micro-organisms are present in the rash  Combinations are common   Sweating may be reduced with a gentle antiperspirant   Physical exertion should be followed by a bath and completely drying the skin folds using a hair dryer on cool setting, soft towel and/or corn starch powder   Triple paste contains petrolatum, zinc oxide, and aluminum acetate solution to reduce friction, irritation and sweating   Bacteria may be treated with topical antibiotics such as fusidic acid cream, mupirocin ointment, or oral antibiotics such as flucloxacillin and erythromycin   Yeasts and fungi may be treated with topical antifungals such as clotrimazole and terbinafine cream or oral antifungal agents such as itraconazole or terbinafine     Inflammatory skin diseases are often treated with low "potency topical steroid creams such as hydrocortisone  More potent steroids are usually avoided in the flexures because they may cause skin thinning resulting in stretch marks (striae) and even ulcers  Calcineurin inhibitors such as tacrolimus ointment or pimecrolimus cream may also prove effective  SEBORRHEIC KERATOSIS; NON-INFLAMED    Physical Exam:   Anatomic Location Affected:  Trunk    Morphological Description:  Flat and raised, waxy, smooth to warty textured, yellow to brownish-grey to dark brown to blackish, discrete, \"stuck-on\" appearing papules   Pertinent Positives:   Pertinent Negatives: Additional History of Present Condition:  Patient reports new bumps on the skin  Denies itch, burn, pain, bleeding or ulceration  Present constantly; nothing seems to make it worse or better  No prior treatment  Assessment and Plan:  Based on a thorough discussion of this condition and the management approach to it (including a comprehensive discussion of the known risks, side effects and potential benefits of treatment), the patient (family) agrees to implement the following specific plan:   Reassured benign   Seborrheic Keratosis  A seborrheic keratosis is a harmless warty spot that appears during adult life as a common sign of skin aging  Seborrheic keratoses can arise on any area of skin, covered or uncovered, with the usual exception of the palms and soles  They do not arise from mucous membranes  Seborrheic keratoses can have highly variable appearance  Seborrheic keratoses are extremely common  It has been estimated that over 90% of adults over the age of 61 years have one or more of them  They occur in males and females of all races, typically beginning to erupt in the 35s or 45s  They are uncommon under the age of 21 years  The precise cause of seborrhoeic keratoses is not known  Seborrhoeic keratoses are considered degenerative in nature   As time goes by, seborrheic keratoses tend to " "become more numerous  Some people inherit a tendency to develop a very large number of them; some people may have hundreds of them  The name \"seborrheic keratosis\" is misleading, because these lesions are not limited to a seborrhoeic distribution (scalp, mid-face, chest, upper back), nor are they formed from sebaceous glands, nor are they associated with sebum -- which is greasy  Seborrheic keratosis may also be called \"SK,\" \"Seb K,\" \"basal cell papilloma,\" \"senile wart,\" or \"barnacle  \"      Researchers have noted:   Eruptive seborrhoeic keratoses can follow sunburn or dermatitis   Skin friction may be the reason they appear in body folds   Viral cause (e g , human papillomavirus) seems unlikely   Stable and clonal mutations or activation of FRFR3, PIK3CA, CHRISSY, AKT1 and EGFR genes are found in seborrhoeic keratoses   Seborrhoeic keratosis can arise from solar lentigo   FRFR3 mutations also arise in solar lentigines  These mutations are associated with increased age and location on the head and neck, suggesting a role of ultraviolet radiation in these lesions   Seborrheic keratoses do not harbour tumour suppressor gene mutations   Epidermal growth factor receptor inhibitors, which are used to treat some cancers, often result in an increase in verrucal (warty) keratoses  There is no easy way to remove multiple lesions on a single occasion  Unless a specific lesion is \"inflamed\" and is causing pain or stinging/burning or is bleeding, most insurance companies do not authorize treatment  DILATED PORE  Physical Exam:   Anatomic Location Affected:  Back    Morphological Description:  Patulous opening with keratinous debris   Pertinent Positives:   Pertinent Negatives: Additional History of Present Condition:  Patient presents with spot of concern      Assessment and Plan:  Based on a thorough discussion of this condition and the management approach to it (including a comprehensive discussion of the " known risks, side effects and potential benefits of treatment), the patient (family) agrees to implement the following specific plan:   Reassured benign     LENTIGO    Physical Exam:   Anatomic Location Affected:  Trunk, upper and lower extremities    Morphological Description:  Several tan brown macules    Pertinent Positives:   Pertinent Negatives: Additional History of Present Condition:  Here for skin exam     Assessment and Plan:  Based on a thorough discussion of this condition and the management approach to it (including a comprehensive discussion of the known risks, side effects and potential benefits of treatment), the patient (family) agrees to implement the following specific plan:   Monitor for changes    When outside we recommend using a wide brim hat, sunglasses, long sleeve and pants, sunscreen with SPF 00+ with reapplication every 2 hours, or SPF specific clothing     What is a lentigo? A lentigo is a pigmented flat or slightly raised lesion with a clearly defined edge  Unlike an ephelis (freckle), it does not fade in the winter months  There are several kinds of lentigo  The name lentigo originally referred to its appearance resembling a small lentil  The plural of lentigo is lentigines, although lentigos is also in common use  Who gets lentigines? Lentigines can affect males and females of all ages and races  Solar lentigines are especially prevalent in fair skinned adults  Lentigines associated with syndromes are present at birth or arise during childhood  What causes lentigines? Common forms of lentigo are due to exposure to ultraviolet radiation:   Sun damage including sunburn    Indoor tanning    Phototherapy, especially photochemotherapy (PUVA)    Ionizing radiation, eg radiation therapy, can also cause lentigines  Several familial syndromes associated with widespread lentigines originate from mutations in David-MAP kinase, mTOR signaling and PTEN pathways      What are the clinical features of lentigines? Lentigines have been classified into several different types depending on what they look like, where they appear on the body, causative factors, and whether they are associated to other diseases or conditions  Lentigines may be solitary or more often, multiple  Most lentigines are smaller than 5 mm in diameter      Lentigo simplex   A precursor to junctional naevus    Arises during childhood and early adult life    Found on trunk and limbs    Small brown round or oval macule or thin plaque    Jagged or smooth edge    May have a dry surface    May disappear in time  Solar lentigo   A precursor to seborrhoeic keratosis    Found on chronically sun exposed sites such as hands, face, lower legs    May also follow sunburn to shoulders    Yellow, light or dark brown regular or irregular macule or thin plaque    May have a dry surface    Often has moth-eaten outline    Can slowly enlarge to several centimeters in diameter    May disappear, often through the process known as lichenoid keratosis    When atypical in appearance, may be difficult to distinguish from melanoma in situ  Ink spot lentigo   Also known as reticulated lentigo    Few in number compared to solar lentigines    Follows sunburn in very fair skinned individuals    Dark brown to black irregular ink spot-like macule  PUVA lentigo   Similar to ink spot lentigo but follows photochemotherapy (PUVA)    Location anywhere exposed to PUVA  Tanning bed lentigo   Similar to ink spot lentigo but follows indoor tanning    Location anywhere exposed to tanning bed  Radiation lentigo   Occurs in site of irradiation (accidental or therapeutic)    Associated with late-stage radiation dermatitis: epidermal atrophy, subcutaneous fibrosis, keratosis, telangiectasias  Melanotic macule   Mucosal surfaces or adjacent glabrous skin eg lip, vulva, penis, anus    Light to dark brown    Also called mucosal melanosis  Generalised lentigines   Found on any exposed or covered site from early childhood    Small macules may merge to form larger patches    Not associated with a syndrome    Also called lentigines profusa, multiple lentigines  Agminated lentigines   Naevoid eruption of lentigos confined to a single segmental area    Sharp demarcation in midline    May have associated neurological and developmental abnormalities  Patterned lentigines   Inherited tendency to lentigines on face, lips, buttocks, palms, soles    Recognised mainly in people of  ethnicity  Centrofacial neurodysraphic lentiginosis  Associated with mental retardation  Lentiginosis syndromes   Syndromes include LEOPARD/Rhoda, Peutz-Jeghers, Laugier-Hunziker, Moynahan, Xeroderma pigmentosum, myxoma syndromes (ALLEN, NAME, Weaver), Ruvalcaba-Myhre-Delaney, Bannayan-Zonnana syndrome, Cowden disease (multiple hamartoma syndrome )    Inheritance is autosomal dominant; sporadic cases common    Widespread lentigines present at birth or arise in early childhood    Associated with neural, endocrine, and mesenchymal tumors    How is the diagnosis made? Lentigines are usually diagnosed clinically by their typical appearance  Concern regarding possibility of melanoma may lead to:   Dermatoscopy    Diagnostic excision biopsy    Histopathology of a lentigo shows:   Thickened epidermis    An increased number of melanocytes along the basal layer of epidermis    Unlike junctional melanocytic naevus, there are no nests of melanocytes    Increased melanin pigment within the keratinocytes    Additional features depending on type of lentigo    In contrast, an ephelis (freckle) shows sun-induced increased melanin within the keratinocytes, without an increase in number of cells  What is the treatment for lentigines? Most lentigines are left alone  Attempts to lighten them may not be successful   The following approaches are used:   SPF 50+ broad-spectrum sunscreen    Hydroquinone bleaching cream    Alpha hydroxy acids    Vitamin C    Retinoids    Azelaic acid    Chemical peels  Individual lesions can be permanently removed using:   Cryotherapy    Intense pulsed light    Pigment lasers    How can lentigines be prevented? Lentigines associated with exposure ultraviolet radiation can be prevented by very careful sun protection  Clothing is more successful at preventing new lentigines than are sunscreens  What is the outlook for lentigines? Lentigines usually persist  They may increase in number with age and sun exposure  Some in sun-protected sites may fade and disappear      Scribe Attestation    I,:  Radhika Arias am acting as a scribe while in the presence of the attending physician :       I,:  Andi Meadows MD personally performed the services described in this documentation    as scribed in my presence :

## 2023-05-04 NOTE — PATIENT INSTRUCTIONS
ACTINIC KERATOSIS     Assessment and Plan:  Based on a thorough discussion of this condition and the management approach to it (including a comprehensive discussion of the known risks, side effects and potential benefits of treatment), the patient (family) agrees to implement the following specific plan:  Patient will hold off until the fall to treat   Appointment scheduled       LENTIGO    Assessment and Plan:  Based on a thorough discussion of this condition and the management approach to it (including a comprehensive discussion of the known risks, side effects and potential benefits of treatment), the patient (family) agrees to implement the following specific plan:  Monitor for changes   When outside we recommend using a wide brim hat, sunglasses, long sleeve and pants, sunscreen with SPF 57+ with reapplication every 2 hours, or SPF specific clothing     What is a lentigo? A lentigo is a pigmented flat or slightly raised lesion with a clearly defined edge  Unlike an ephelis (freckle), it does not fade in the winter months  There are several kinds of lentigo  The name lentigo originally referred to its appearance resembling a small lentil  The plural of lentigo is lentigines, although lentigos is also in common use  Who gets lentigines? Lentigines can affect males and females of all ages and races  Solar lentigines are especially prevalent in fair skinned adults  Lentigines associated with syndromes are present at birth or arise during childhood    LICHEN SCLEROSUS ATROPHICUS    Assessment and Plan:  Based on a thorough discussion of this condition and the management approach to it (including a comprehensive discussion of the known risks, side effects and potential benefits of treatment), the patient (family) agrees to implement the following specific plan:  Continue clobetasol cream 0 05% twice a day topically for 2 weeks then as needed for itching     Lichen sclerosus is a chronic inflammatory skin "disorder that most often affects the genital and perianal areas  It is more common in women before puberty or after menopause  It is rare in males, but when present is called \"balanitis xerotica obliterans  \"  Lichen sclerosus can start at any age, although it is most often diagnosed in women over 48  Pre-pubertal children can also be affected  Lichen sclerosis is 10 times more common in women than in men  15% of patients know of a family member with lichen sclerosis  It may follow or co-exist with another skin condition, most often lichen simplex, psoriasis, erosive lichen planus, vitiligo or morphea  People with lichen sclerosus often have a personal or family history of other autoimmune disease such as thyroid disease (about 20% of patients), pernicious anaemia, or alopecia areata  What causes lichen sclerosus? The cause of lichen sclerosus is not fully understood, and may include genetic, hormonal, irritant, traumatic and infectious components  Lichen sclerosis is often classified as an autoimmune disease  Autoimmune disorders arise when the body's natural defenses against foreign or invading organisms (e g , antibodies) begin to attack healthy tissue for unknown reasons  Antibodies to other unknown proteins may account for other cases, explaining differing presentations of lichen sclerosis and response to treatment  However, these antibodies could be epigenetic, ie, the results of disease rather than the cause of disease  Some scientists believe that a genetic predisposition to lichen sclerosus exists  A genetic predisposition means that a person may carry a gene for a disease but it may not be expressed unless something in the environment triggers the disease  Other researchers believe that hormonal, irritant and/or infectious factors (or a combination of these) cause this skin condition   Cases where lichen sclerosus appears on skin after it has been damaged (from an injury or trauma) have been " reported  INTERTRIGO    Assessment and Plan:  Based on a thorough discussion of this condition and the management approach to it (including a comprehensive discussion of the known risks, side effects and potential benefits of treatment), the patient (family) agrees to implement the following specific plan:  Continue nystatin powder topically as needed to abdomen area     Assessment and Plan  Intertrigo describes a rash in the flexures or body folds, such as behind the ears, in the folds of the neck, under the arms (axillae), under a protruding abdomen, in the groin, between the buttocks, in the finger webs or toe spaces  Although intertrigo may affect one skin fold, it is common for it to involve multiple sites  Intertrigo can affect males and females of any age  It is particularly common in people that are overweight or obese (see metabolic syndrome)  Other contributing factors are:  Genetic tendency to skin disease  Hyperhidrosis (excessive sweating)    Intertrigo can be acute (recent onset), relapsing (recurrent), or chronic (present for more than 6 weeks)  The exact appearance and behaviour depends on the underlying cause or causes  The skin affected by intertrigo is inflamed, ie reddened and uncomfortable  It may become moist and macerated, leading to fissuring (cracks) and peeling  Intertrigo is due to genetic and environmental factors  Flexural skin has relatively high surface temperature  Moisture from insensible water loss and sweating cannot evaporate due to occlusion  Friction from movement of adjacent skin results in chafing    SEBORRHEIC KERATOSIS; NON-INFLAMED    Assessment and Plan:  Based on a thorough discussion of this condition and the management approach to it (including a comprehensive discussion of the known risks, side effects and potential benefits of treatment), the patient (family) agrees to implement the following specific plan:  Reassured benign   Seborrheic Keratosis  A seborrheic keratosis is a harmless warty spot that appears during adult life as a common sign of skin aging  Seborrheic keratoses can arise on any area of skin, covered or uncovered, with the usual exception of the palms and soles  They do not arise from mucous membranes  Seborrheic keratoses can have highly variable appearance  Seborrheic keratoses are extremely common  It has been estimated that over 90% of adults over the age of 61 years have one or more of them  They occur in males and females of all races, typically beginning to erupt in the 35s or 45s  They are uncommon under the age of 21 years  The precise cause of seborrhoeic keratoses is not known  Seborrhoeic keratoses are considered degenerative in nature  As time goes by, seborrheic keratoses tend to become more numerous  Some people inherit a tendency to develop a very large number of them; some people may have hundreds of them        DILATED PORE    Assessment and Plan:  Based on a thorough discussion of this condition and the management approach to it (including a comprehensive discussion of the known risks, side effects and potential benefits of treatment), the patient (family) agrees to implement the following specific plan:  Reassured benign

## 2023-09-05 ENCOUNTER — OFFICE VISIT (OUTPATIENT)
Age: 88
End: 2023-09-05
Payer: COMMERCIAL

## 2023-09-05 VITALS — WEIGHT: 194.2 LBS | HEIGHT: 60 IN | TEMPERATURE: 96.5 F | BODY MASS INDEX: 38.13 KG/M2

## 2023-09-05 DIAGNOSIS — L57.0 ACTINIC KERATOSIS: Primary | ICD-10-CM

## 2023-09-05 PROCEDURE — 17000 DESTRUCT PREMALG LESION: CPT | Performed by: DERMATOLOGY

## 2023-09-05 PROCEDURE — 99214 OFFICE O/P EST MOD 30 MIN: CPT | Performed by: DERMATOLOGY

## 2023-09-05 PROCEDURE — 17003 DESTRUCT PREMALG LES 2-14: CPT | Performed by: DERMATOLOGY

## 2023-09-05 NOTE — PATIENT INSTRUCTIONS
ACTINIC KERATOSIS      Assessment and Plan:  Based on a thorough discussion of this condition and the management approach to it (including a comprehensive discussion of the known risks, side effects and potential benefits of treatment), the patient (family) agrees to implement the following specific plan:    Liquid nitrogen was applied for 10-12 seconds to the skin lesion and the expected blistering or scabbing reaction explained. Do not pick at the area. Patient reminded to expect hypopigmented scars from the procedure. Return if lesion fails to fully resolve.   Follow up 6 months

## 2023-09-05 NOTE — PROGRESS NOTES
[facility] Note     Patient Name: Surya Espana  Encounter Date: 9/5/23    Have you been cared for by a [facility]ologist in the last 3 years and, if so, which description applies to you? Yes. I have been here within the last 3 years, and my medical history has NOT changed since that time. I am FEMALE/of child-bearing potential.    REVIEW OF SYSTEMS:  Have you recently had or currently have any of the following? No changes in my recent health. PAST MEDICAL HISTORY:  Have you personally ever had or currently have any of the following? If "YES," then please provide more detail. No changes in my medical history. FAMILY HISTORY:  Any "first degree relatives" (parent, brother, sister, or child) with the following? No changes in my family's known health. PATIENT EXPERIENCE:    Do you want the Dermatologist to perform a COMPLETE skin exam today including a clinical examination under the "bra and underwear" areas? NO  If necessary, do we have your permission to call and leave a detailed message on your Preferred Phone number that includes your specific medical information?   Yes      Allergies   Allergen Reactions   • Shellfish Allergy - Food Allergy Shortness Of Breath     Action Taken: Lobster;    • Shellfish-Derived Products - Food Allergy Shortness Of Breath     Other reaction(s): Respiratory Distress  Lobster only  Other reaction(s): Respiratory Distress: Lobster   • Sulfa Antibiotics Shortness Of Breath   • Wound Dressing Adhesive Dermatitis     Action Taken: rash; Pulls pt's skin off   • Medical Tape      Other reaction(s): Uticaria/Hives   • Other      Lobster      Current Outpatient Medications:   •  acetaminophen (TYLENOL) 500 mg tablet, Take 500 mg by mouth every 6 (six) hours, Disp: , Rfl:   •  Ascorbic Acid (VITAMIN C) 100 MG CHEW, Chew in the morning, Disp: , Rfl:   •  atorvastatin (LIPITOR) 10 mg tablet, Take 10 mg by mouth daily, Disp: , Rfl:   •  atorvastatin (LIPITOR) 40 mg tablet, Take 40 mg by mouth every evening, Disp: , Rfl:   •  brimonidine tartrate 0.2 % ophthalmic solution, INSTILL 1 DROP IN THE LEFT EYE TWO TIMES DAILY, Disp: , Rfl:   •  Calcium Ascorbate 500 MG TABS, Take 500 mg by mouth 2 (two) times a day, Disp: , Rfl:   •  clobetasol (TEMOVATE) 0.05 % cream, Apply to vaginal area twice weekly, Disp: 60 g, Rfl: 1  •  Folic Acid 0.8 MG CAPS, Take by mouth, Disp: , Rfl:   •  Folic Acid 20 MG CAPS, Take by mouth daily, Disp: , Rfl:   •  lisinopril (ZESTRIL) 5 mg tablet, , Disp: , Rfl:   •  Multiple Vitamins-Minerals (DAILY MULTIVITAMIN PO), Take 1 tablet by mouth daily, Disp: , Rfl:   •  Multiple Vitamins-Minerals (PRESERVISION AREDS) CAPS, Take by mouth daily, Disp: , Rfl:   •  clobetasol (TEMOVATE) 0.05 % cream, Apply 1 application topically daily as needed (Patient not taking: Reported on 7/6/2022), Disp: , Rfl: 4  •  erythromycin (ILOTYCIN) ophthalmic ointment, APPLY 1 APPLICATION IN RIGHT EYE NIGHTLY (Patient not taking: No sig reported), Disp: , Rfl: 0  •  hydrOXYzine HCL (ATARAX) 10 mg tablet, 1/2 tab po at bedtime for itching (Patient not taking: Reported on 9/5/2023), Disp: , Rfl:   •  lisinopril (ZESTRIL) 5 mg tablet, Take 5 mg by mouth daily, Disp: , Rfl:   •  metFORMIN (GLUCOPHAGE) 500 mg tablet, TAKE 1 TABLET TWICE DAILY WITH FOOD. (Patient not taking: Reported on 7/6/2022), Disp: 180 tablet, Rfl: 1  •  Multiple Vitamins-Minerals (PRESERVISION AREDS 2 PO), Take by mouth (Patient not taking: Reported on 7/6/2022), Disp: , Rfl:   •  nystatin (MYCOSTATIN) powder, Apply topically 2 (two) times a day Twice a day topically under stomach area, Disp: 60 g, Rfl: 3  •  pimecrolimus (ELIDEL) 1 % cream, apply by topical route 2 times every day a thin layer to the affected area(s) ; rub in gently and completely x 6 week (Patient not taking: Reported on 9/5/2023), Disp: , Rfl:   •  prednisoLONE acetate (PRED FORTE) 1 % ophthalmic suspension, , Disp: , Rfl: Whom besides the patient is providing clinical information about today's encounter? NO ADDITIONAL HISTORIAN (patient alone provided history)    Physical Exam and Assessment/Plan by Diagnosis:    ACTINIC KERATOSIS   Physical Exam:  Anatomic Location Affected:  forehead right cheek, right forearm. Morphological Description:  Pink scaly papules   Pertinent Positives:  Pertinent Negatives:     Assessment and Plan:  Based on a thorough discussion of this condition and the management approach to it (including a comprehensive discussion of the known risks, side effects and potential benefits of treatment), the patient (family) agrees to implement the following specific plan:  Liquid nitrogen was applied for 10-12 seconds to the skin lesion and the expected blistering or scabbing reaction explained. Do not pick at the area. Patient reminded to expect hypopigmented scars from the procedure. Return if lesion fails to fully resolve. Follow up in 6 months    PROCEDURE:  DESTRUCTION OF PRE-MALIGNANT LESIONS  After a thorough discussion of treatment options and risk/benefits/alternatives (including but not limited to local pain, scarring, dyspigmentation, blistering, and possible superinfection), verbal and written consent were obtained and the aforementioned lesions were treated on with cryotherapy using liquid nitrogen x 1 cycle for 5-10 seconds. TOTAL NUMBER of 6 pre-malignant lesions were treated today on the ANATOMIC LOCATION: forehead right cheek, right forearm. The patient tolerated the procedure well, and after-care instructions were provided.     Scribe Attestation    I,:  Margie Small am acting as a scribe while in the presence of the attending physician.:       I,:  Joan Rodney MD personally performed the services described in this documentation    as scribed in my presence.:

## 2024-02-21 PROBLEM — Z00.00 ROUTINE HEALTH MAINTENANCE: Status: RESOLVED | Noted: 2018-03-27 | Resolved: 2024-02-21

## 2024-04-08 ENCOUNTER — ESTABLISHED COMPREHENSIVE EXAM (OUTPATIENT)
Dept: URBAN - METROPOLITAN AREA CLINIC 6 | Facility: CLINIC | Age: 89
End: 2024-04-08

## 2024-04-08 DIAGNOSIS — H43.813: ICD-10-CM

## 2024-04-08 DIAGNOSIS — H35.3231: ICD-10-CM

## 2024-04-08 DIAGNOSIS — H40.053: ICD-10-CM

## 2024-04-08 DIAGNOSIS — H26.491: ICD-10-CM

## 2024-04-08 DIAGNOSIS — E11.3293: ICD-10-CM

## 2024-04-08 PROCEDURE — 92014 COMPRE OPH EXAM EST PT 1/>: CPT

## 2024-04-08 PROCEDURE — 92133 CPTRZD OPH DX IMG PST SGM ON: CPT

## 2024-04-08 ASSESSMENT — VISUAL ACUITY
OD_CC: 20/30-1
OU_SC: J2
OS_CC: 20/30

## 2024-04-08 ASSESSMENT — TONOMETRY
OS_IOP_MMHG: 24
OD_IOP_MMHG: 22

## 2024-10-09 ENCOUNTER — IOP CHECK (OUTPATIENT)
Dept: URBAN - METROPOLITAN AREA CLINIC 6 | Facility: CLINIC | Age: 89
End: 2024-10-09

## 2024-10-09 DIAGNOSIS — H35.3231: ICD-10-CM

## 2024-10-09 DIAGNOSIS — H40.053: ICD-10-CM

## 2024-10-09 PROCEDURE — 92134 CPTRZ OPH DX IMG PST SGM RTA: CPT

## 2024-10-09 PROCEDURE — 92012 INTRM OPH EXAM EST PATIENT: CPT

## 2024-10-09 ASSESSMENT — VISUAL ACUITY
OD_CC: 20/40+1
OS_CC: 20/30

## 2024-10-09 ASSESSMENT — TONOMETRY
OS_IOP_MMHG: 12
OS_IOP_MMHG: 11
OD_IOP_MMHG: 12
OD_IOP_MMHG: 12

## 2025-02-10 ENCOUNTER — OFFICE VISIT (OUTPATIENT)
Age: 89
End: 2025-02-10
Payer: COMMERCIAL

## 2025-02-10 VITALS — WEIGHT: 200.2 LBS | HEIGHT: 60 IN | BODY MASS INDEX: 39.3 KG/M2 | TEMPERATURE: 97.2 F

## 2025-02-10 DIAGNOSIS — D18.01 CHERRY ANGIOMA: ICD-10-CM

## 2025-02-10 DIAGNOSIS — L82.1 SEBORRHEIC KERATOSES: Primary | ICD-10-CM

## 2025-02-10 DIAGNOSIS — D22.70 MULTIPLE BENIGN MELANOCYTIC NEVI OF UPPER EXTREMITY, LOWER EXTREMITY, AND TRUNK: ICD-10-CM

## 2025-02-10 DIAGNOSIS — L40.9 PSORIASIS: ICD-10-CM

## 2025-02-10 DIAGNOSIS — L81.4 SOLAR LENTIGO: ICD-10-CM

## 2025-02-10 DIAGNOSIS — D22.5 MULTIPLE BENIGN MELANOCYTIC NEVI OF UPPER EXTREMITY, LOWER EXTREMITY, AND TRUNK: ICD-10-CM

## 2025-02-10 DIAGNOSIS — L57.0 KERATOSIS, ACTINIC: ICD-10-CM

## 2025-02-10 DIAGNOSIS — D22.60 MULTIPLE BENIGN MELANOCYTIC NEVI OF UPPER EXTREMITY, LOWER EXTREMITY, AND TRUNK: ICD-10-CM

## 2025-02-10 DIAGNOSIS — L90.0 LICHEN SCLEROSUS: ICD-10-CM

## 2025-02-10 PROCEDURE — 99214 OFFICE O/P EST MOD 30 MIN: CPT | Performed by: DERMATOLOGY

## 2025-02-10 PROCEDURE — 17000 DESTRUCT PREMALG LESION: CPT | Performed by: DERMATOLOGY

## 2025-02-10 NOTE — PROGRESS NOTES
"[facility] Note     Patient Name: Akosua Mullins  Encounter Date: 2/10/25     Have you been cared for by a [facility] Dermatologist in the last 3 years and, if so, which description applies to you?    Yes.  I have been here within the last 3 years, and my medical history has NOT changed since that time.  I am FEMALE/of child-bearing potential.    REVIEW OF SYSTEMS:  Have you recently had or currently have any of the following? No changes in my recent health.   PAST MEDICAL HISTORY:  Have you personally ever had or currently have any of the following?  If \"YES,\" then please provide more detail. No changes in my medical history.   HISTORY OF IMMUNOSUPPRESSION: Do you have a history of any of the following:  Systemic Immunosuppression such as Diabetes, Biologic or Immunotherapy, Chemotherapy, Organ Transplantation, Bone Marrow Transplantation or Prednisone?  No     Answering \"YES\" requires the addition of the dotphrase \"IMMUNOSUPPRESSED\" as the first diagnosis of the patient's visit.   FAMILY HISTORY:  Any \"first degree relatives\" (parent, brother, sister, or child) with the following?    No changes in my family's known health.   PATIENT EXPERIENCE:    Do you want the Dermatologist to perform a COMPLETE skin exam today including a clinical examination under the \"bra and underwear\" areas?  Yes  If necessary, do we have your permission to call and leave a detailed message on your Preferred Phone number that includes your specific medical information?  Yes      Allergies   Allergen Reactions   • Shellfish Allergy - Food Allergy Shortness Of Breath     Action Taken: Lobster;    • Shellfish-Derived Products - Food Allergy Shortness Of Breath     Other reaction(s): Respiratory Distress  Lobster only  Other reaction(s): Respiratory Distress: Lobster   • Sulfa Antibiotics Shortness Of Breath   • Wound Dressing Adhesive Dermatitis     Action Taken: rash; Pulls pt's skin off   • Medical Tape      Other " reaction(s): Uticaria/Hives   • Other      Lobster   • Trimethoprim Other (See Comments)      Current Outpatient Medications:   •  acetaminophen (TYLENOL) 500 mg tablet, Take 500 mg by mouth every 6 (six) hours, Disp: , Rfl:   •  Ascorbic Acid (VITAMIN C) 100 MG CHEW, Chew in the morning, Disp: , Rfl:   •  atorvastatin (LIPITOR) 40 mg tablet, Take 40 mg by mouth every evening, Disp: , Rfl:   •  brimonidine tartrate 0.2 % ophthalmic solution, INSTILL 1 DROP IN THE LEFT EYE TWO TIMES DAILY, Disp: , Rfl:   •  Calcium Ascorbate 500 MG TABS, Take 500 mg by mouth 2 (two) times a day, Disp: , Rfl:   •  clobetasol (TEMOVATE) 0.05 % cream, Apply to vaginal area twice weekly, Disp: 60 g, Rfl: 1  •  Folic Acid 0.8 MG CAPS, Take by mouth, Disp: , Rfl:   •  lisinopril (ZESTRIL) 5 mg tablet, , Disp: , Rfl:   •  lisinopril (ZESTRIL) 5 mg tablet, Take 5 mg by mouth daily, Disp: , Rfl:   •  Multiple Vitamins-Minerals (DAILY MULTIVITAMIN PO), Take 1 tablet by mouth daily, Disp: , Rfl:   •  atorvastatin (LIPITOR) 10 mg tablet, Take 10 mg by mouth daily (Patient not taking: Reported on 2/10/2025), Disp: , Rfl:   •  clobetasol (TEMOVATE) 0.05 % cream, Apply 1 application topically daily as needed (Patient not taking: Reported on 7/6/2022), Disp: , Rfl: 4  •  erythromycin (ILOTYCIN) ophthalmic ointment, APPLY 1 APPLICATION IN RIGHT EYE NIGHTLY (Patient not taking: Reported on 7/6/2022), Disp: , Rfl: 0  •  Folic Acid 20 MG CAPS, Take by mouth daily (Patient not taking: Reported on 2/10/2025), Disp: , Rfl:   •  hydrOXYzine HCL (ATARAX) 10 mg tablet, 1/2 tab po at bedtime for itching (Patient not taking: Reported on 2/10/2025), Disp: , Rfl:   •  metFORMIN (GLUCOPHAGE) 500 mg tablet, TAKE 1 TABLET TWICE DAILY WITH FOOD. (Patient not taking: Reported on 7/6/2022), Disp: 180 tablet, Rfl: 1  •  Multiple Vitamins-Minerals (PRESERVISION AREDS 2 PO), Take by mouth (Patient not taking: Reported on 7/6/2022), Disp: , Rfl:   •  Multiple  Vitamins-Minerals (PRESERVISION AREDS) CAPS, Take by mouth daily (Patient not taking: Reported on 2/10/2025), Disp: , Rfl:   •  nystatin (MYCOSTATIN) powder, Apply topically 2 (two) times a day Twice a day topically under stomach area, Disp: 60 g, Rfl: 3  •  pimecrolimus (ELIDEL) 1 % cream, apply by topical route 2 times every day a thin layer to the affected area(s) ; rub in gently and completely x 6 week (Patient not taking: Reported on 2/10/2025), Disp: , Rfl:   •  prednisoLONE acetate (PRED FORTE) 1 % ophthalmic suspension, , Disp: , Rfl:           Whom besides the patient is providing clinical information about today's encounter?   NO ADDITIONAL HISTORIAN (patient alone provided history)    Physical Exam and Assessment/Plan by Diagnosis:    PSORIASIS    Physical Exam:  Anatomic Location Affected:  inner thigh, back  Morphological Description:  pink scaly plaques  Severity: mild  Body Percent Affected: 2  Pertinent Positives:  Pertinent Negatives:    Additional History of Present Condition:  is using clobetasol 0.05% cream prescribed by gynecologist    Assessment and Plan:  Based on a thorough discussion of this condition and the management approach to it (including a comprehensive discussion of the known risks, side effects and potential benefits of treatment), the patient (family) agrees to implement the following specific plan:  Continue clobetasol 0.05% cream.     Psoriasis is a chronic inflammatory condition that causes the body to make new skin cells in days rather than weeks. As these cells pile up on the surface of the skin, you may see thick, scaly patches of thickened skin.   Psoriasis affects 2-4% of males and females. It can start at any age including childhood, with peaks of onset at 15-25 years and 50-60 years. It tends to persist lifelong, fluctuating in extent and severity. It is particularly common in Caucasians but may affect people of any race. About one-third of patients with psoriasis have  family members with psoriasis.  Psoriasis is multifactorial. It is classified as an immune-mediated inflammatory disease (IMID). Genetic factors are important and influence the type of psoriasis and response to treatment.     What are the signs and symptoms of psoriasis?    There are many different types of psoriasis that each have present uniquely. The types of psoriasis include:    Plaque psoriasis: About 80% to 90% of people who have psoriasis develop this type. When plaque psoriasis appears, you may see:  Plaque psoriasis usually presents with symmetrically distributed, red, scaly plaques with well-defined edges. The scale is typically silvery white, except in skin folds where the plaques often appear shiny and they may have a moist peeling surface. The most common sites are scalp, elbows and knees, but any part of the skin can be involved. The plaques are usually very persistent without treatment.  Itch is mostly mild but may be severe in some patients, leading to scratching and lichenification (thickened leathery skin with increased skin markings). Painful skin cracks or fissures may occur.  When psoriatic plaques clear up, they may leave brown or pale marks that can be expected to fade over several months.    Guttate psoriasis: When someone gets this type of psoriasis, you often see tiny bumps appear on the skin quite suddenly. The bumps tend to cover much of the torso, legs, and arms. Sometimes, the bumps also develop on the face, scalp, and ears. No matter where they appear, the bumps tend to be:   Small and scaly  [place]-colored to pink  Temporary, clearing in a few weeks or months without treatment  When guttate psoriasis clears, it may never return. Why this happens is still a bit of a mystery. Guttate psoriasis tends to develop in children and young adults who've had an infection, such as strep throat. It's possible that when the infection clears so does guttate psoriasis.  It's also possible to  have:  Guttate psoriasis for life  See the guttate psoriasis clear and plaque psoriasis develop later in life  Plaque psoriasis when you develop guttate psoriasis  There's no way to predict what will happen after the first flare-up of guttate psoriasis clears.    Inverse psoriasis: This type of psoriasis develops in areas where skin touches skin, such as the armpits, genitals, and crease of the buttocks. Where the inverse psoriasis appears, you're likely to notice:  Smooth, red patches of skin that look raw  Little, if any, silvery-white coating  Sore or painful skin  Other names for this type of psoriasis are intertriginous psoriasis or flexural psoriasis.  Pustular psoriasis: This type of psoriasis causes pus-filled bumps that usually appear only on the feet and hands. While the pus-filled bumps may look like an infection, the skin is not infected. The bumps don't contain bacteria or anything else that could cause an infection.  Where pustular psoriasis appears, you tend to notice:  Red, swollen skin that is dotted with pus-filled bumps  Extremely sore or painful skin  Brown dots (and sometimes scale) appear as the pus-filled bumps dry  Pustular psoriasis can make just about any activity that requires your hands or feet, such as typing or walking, unbearably painful.    Pustular psoriasis (generalized): Serious and life-threatening, this rare type of psoriasis causes pus-filled bumps to develop on much of the skin. Also called von Zumbusch psoriasis, a flare-up causes this sequence of events:  Skin on most of the body suddenly turns dry, red, and tender.  Within hours, pus-filled bumps cover most of the skin.  Often within a day, the pus-filled bumps break open and pools of pus leak onto the skin.  As the pus dries (usually within 24 to 48 hours), the skin dries out and peels (as shown in this picture).  When the dried skin peels off, you see a smooth, glazed surface.  In a few days or weeks, you may see a new  crop of pus-filled bumps covering most of the skin, as the cycle repeats itself.  Anyone with pustular psoriasis also feels very sick, and may develop a fever, headache, muscle weakness, and other symptoms. Medical care is often necessary to save the person's life.    Erythrodermic psoriasis: Serious and life-threatening, this type of psoriasis requires immediate medical care. When someone develops erythrodermic psoriasis, you may notice:  Skin on most of the body looks burnt  Chills, fever, and the person looks extremely ill  Muscle weakness, a rapid pulse, and severe itch  The person may also be unable to keep warm, so hypothermia can set in quickly.  Most people who develop this type of psoriasis already have another type of psoriasis. Before developing erythrodermic psoriasis, they often notice that their psoriasis is worsening or not improving with treatment. If you notice either of these happening, see a board-certified dermatologist.    Nails    Nail psoriasis: With any type of psoriasis, you may see changes to your fingernails or toenails. About half of the people who have plaque psoriasis see signs of psoriasis on their fingernails at some point2.  When psoriasis affects the nails, you may notice:  Tiny dents in your nails (called “nail pits”)  White, yellow, or brown discoloration under one or more nails  Crumbling, rough nails  A nail lifting up so that it's no longer attached  Buildup of skin cells beneath one or more nails, which lifts up the nail  Treatment and proper nail care can help you control nail psoriasis.    Psoriatic arthritis: If you have psoriasis, it's important to pay attention to your joints. Some people who have psoriasis develop a type of arthritis called psoriatic arthritis. This is more likely to occur if you have severe psoriasis.  Most people notice psoriasis on their skin years before they develop psoriatic arthritis. It's also possible to get psoriatic arthritis before psoriasis,  but this is less common.  When psoriatic arthritis develops, the signs can be subtle. At first, you may notice:  A swollen and tender joint, especially in a finger or toe  Heel pain  Swelling on the back of your leg, just above your heel  Stiffness in the morning that fades during the day  Like psoriasis, psoriatic arthritis cannot be cured. Treatment can prevent psoriatic arthritis from worsening, which is important. Allowed to progress, psoriatic arthritis can become disabling.    Diagnosis and treatment of psoriasis   Psoriasis is usually diagnosed by clinical features, and skin biopsy if necessary.   It is important to decrease factors that aggravate psoriasis. These include treating streptococcal infections, minimizing skin injuries, avoiding sun exposure if it exacerbates psoriasis, smoking, alcohol usage, decreasing stress, and maintaining an optimal body weight. Certain medications such as lithium, beta blockers, antimalarials, and NSAIDs have also been implicated. Suddenly stopping oral steroids or strong topical steroids can cause rebound disease.     There are many categories of psoriasis treatments available.     Topical therapy  Mild psoriasis is generally treated with topical agents alone. Which treatment is selected may depend on body site, extent and severity of psoriasis.  Emollients  [place] tar preparations  Dithranol  Salicylic acid  Vitamin D analogue (calcipotriol)  Topical corticosteroids  Calcineurin inhibitor (tacrolimus, pimecrolimus)  Phototherapy  Most psoriasis centres offer phototherapy with ultraviolet (UV) radiation, often in combination with topical or systemic agents. Types of phototherapy include  Narrowband UVB  Broadband UVB  Photochemotherapy (PUVA)  Targeted phototherapy  Systemic therapy  Moderate to severe psoriasis warrants treatment with a systemic agent and/or phototherapy. The most common treatments are:  Methotrexate  Ciclosporin  Acitretin  Other medicines occasionally  "used for psoriasis include:  Mycophenolate  Apremilast  Hydroxyurea  Azathioprine  6-mercaptopurine  Systemic corticosteroids are best avoided due to a risk of severe withdrawal flare of psoriasis and adverse effects.  Biologics or targeted therapies are reserved for conventional treatment-resistant severe psoriasis, mainly because of expense, as side effects compare favorably with other systemic agents. These include:  Anti-tumour necrosis factor-alpha antagonists (anti-TNFa) infliximab, adalimumab and etanercept  The interleukin (IL)-12/23 antagonist ustekinumab  IL-17 antagonists such as secukinumab  Many other monoclonal antibodies are under investigation in the treatment of psoriasis.     LICHEN SCLEROSUS ATROPHICUS    Physical Exam:  Anatomic Location Affected:  vagina; area, labia minora  Morphological Description:  not evaluated  Pertinent Positives:  Pertinent Negatives:    Additional History of Present Condition:  patient is on clobetasol 0.05% cream is doing well so far.    Assessment and Plan:  Based on a thorough discussion of this condition and the management approach to it (including a comprehensive discussion of the known risks, side effects and potential benefits of treatment), the patient (family) agrees to implement the following specific plan:  Continue clobetasol 0.05% cream as directed.  Return in a year.    Lichen sclerosus is a chronic inflammatory skin disorder that most often affects the genital and perianal areas. It is more common in women before puberty or after menopause. It is rare in males, but when present is called \"balanitis xerotica obliterans.\"  Lichen sclerosus can start at any age, although it is most often diagnosed in women over 50. Pre-pubertal children can also be affected.  Lichen sclerosis is 10 times more common in women than in men.  15% of patients know of a family member with lichen sclerosis.  It may follow or co-exist with another skin condition, most often lichen " simplex, psoriasis, erosive lichen planus, vitiligo or morphea.  People with lichen sclerosus often have a personal or family history of other autoimmune disease such as thyroid disease (about 20% of patients), pernicious anaemia, or alopecia areata.    What causes lichen sclerosus?  The cause of lichen sclerosus is not fully understood, and may include genetic, hormonal, irritant, traumatic and infectious components.  Lichen sclerosis is often classified as an autoimmune disease. Autoimmune disorders arise when the body's natural defenses against “foreign” or invading organisms (e.g., antibodies) begin to attack healthy tissue for unknown reasons.  Antibodies to other unknown proteins may account for other cases, explaining differing presentations of lichen sclerosis and response to treatment.  However, these antibodies could be epigenetic, ie, the results of disease rather than the cause of disease.  Some scientists believe that a genetic predisposition to lichen sclerosus exists. A genetic predisposition means that a person may carry a gene for a disease but it may not be expressed unless something in the environment triggers the disease. Other researchers believe that hormonal, irritant and/or infectious factors (or a combination of these) cause this skin condition. Cases where lichen sclerosus appears on skin after it has been damaged (from an injury or trauma) have been reported.    What are the clinical features of lichen sclerosus?  Lichen sclerosus usually affects the external genitalia (vulva or penis) and/or the area around the anus (perianal region). Sometimes, it is accompanied by intense (intractable) itching, burning, and pain. If the disease is severe, even minor abrasions or chaffing can cause bleeding, tearing, and blistering. The scarring that results from untreated lichen sclerosis produces problems with urination, defecation, and intercourse. The presence of thin, easily irritated, and torn skin  affects physical activity and clothing choice.    For children with lichen sclerosus affecting the perianal region, constipation may be among the first signs of the presence of the disease. Lichen sclerosus is much more likely to affect males that have not been circumcised than males that have been.  Rarely, lichen sclerosus can also affect other areas of the skin such as the breast, wrists, shoulder, neck, back, thigh, and the mouth.    Skin tissue often becomes thin, shiny, wrinkled and parchment-like. Fissures, cracks, and purplish patches (ecchymoses) appear frequently. The earliest areas of lichen sclerosis exhibit a porcelain white appearing center surrounded by redness. This grows together to form larger areas of lichen sclerosus. The areas that are prone to rubbing and friction can develop blisters or bruising. The long term result of lichen sclerosus are areas of shiny, thin skin that has a tendency to be dry, crack, or bleed. This also produces loss of the normal parts of the external genitals, narrowing of the opening of the urethra/vagina/anus, and phimosis (inability to retract the foreskin) in men. The presence of non-healing ulcers or raised ulcerated areas in the external genitalia of women raises suspicion for the development of squamous cell carcinoma.    In males, lichen sclerosus most commonly affects the foreskin of the penis, although it may affect other areas of the body. The opening at the end of the foreskin may become narrow and scarred. Discoloration and skin changes may also occur. Symptoms also include itching, soreness, and painful erections. In men, involvement in the perineal area is rare.    In some rare cases, skin lesions may also develop in the mouth. The lesions consist of bluish-white flat irregular patchy areas on the inside of the cheeks and/or palate. The tongue, lips, and gums may also be involved.    How do we diagnose lichen sclerosus atrophicus?  Lichen sclerosus is  diagnosed by looking at the skin affected. All those affected require a thorough clinical evaluation, identification of characteristic physical features, and a detailed patient history. A biopsy may be needed to confirm diagnosis. Biopsies may also be performed if squamous cell carcinoma is suspected.    How do we treat lichen sclerosis?  General measures for genital lichen sclerosis  Wash gently once or twice daily.  Use a non-soap cleanser, if any.  Try to avoid tight clothing, rubbing and scratching.  Activities such as riding a bicycle or horse may aggravate symptoms.  If incontinent, seek medical advice and treatment.  Apply emollients to relieve dryness and itching, and as a barrier to protect sensitive skin in genital and anal areas from contact with urine and feces.    Topical steroids are the main treatment for lichen sclerosus. An ultrapotent topical steroid is often prescribed, eg clobetasol propionate 0.05%. A potent topical steroid, eg mometasone furoate 0.1% ointment, may also be used in mild disease or when symptoms are controlled.  An ointment base is less likely than cream to sting or to cause contact dermatitis.  A thin smear should be precisely applied to the white plaques and rubbed in gently.  Most patients will be told to apply the steroid ointment once a day. After one to three months (depending on the severity of the disease), the ointment can be used less often.  Topical steroid may need to be continued once or twice a week to control symptoms or to prevent lichen sclerosis recurring.  Itch often settles within a few days but it may take weeks to months for the skin to return to normal (if at all).  One 30-g tube of topical steroid should last 3 to 6 months or longer.    It is most important to follow instructions carefully and to attend follow-up appointments regularly.    Other topical treatments used in patients with lichen sclerosis include:  Intravaginal estrogen cream or pessaries in  postmenopausal women. These reduce symptoms due to atrophic vulvovaginitis (dry, thin, fissured and sensitive vulval and vaginal tissues due to hormonal deficiency).  Topical calcineurin inhibitors tacrolimus ointment and pimecrolimus cream instead of or in addition to topical steroids. They tend to cause burning discomfort (at least for the first few days). Early concern that these medications may have the potential to accelerate cancer growth in the presence of oncogenic human papilloma virus (the cause of genital warts) appears unfounded.  Topical retinoid (eg tretinoin cream) is not well tolerated on genital skin but may be applied to other sites affected by lichen sclerosis. It reduces scaling and dryness.    Oral medications: when severe, acute, and not responding to topical therapy, systemic treatment may rarely be prescribed. Options include:  Intralesional or systemic corticosteroids   Oral retinoids: acitretin, isotretinoin  Methotrexate  Ciclosporin    Surgery: is essential for high-grade squamous intraepithelial lesions or cancer.  In males, circumcision is effective in lichen sclerosus affecting prepuce and glans of the penis. It is best done early if initial topical steroids have not controlled symptoms and signs. If the urethra is stenosed or scarred, reconstructive surgery may be necessary.  Unfortunately, lichen sclerosus sometimes closes up the vaginal opening again after surgery has initially appeared successful. It can be repeated.  Other reported treatments for lichen sclerosus are considered experimental at this time.  CO2 laser ablation of hyperkeratotic plaques  Phototherapy  Photodynamic therapy  Fat injections  Stem cell and platelet-rich plasma injections     SEBORRHEIC KERATOSES  - Relevant exam: Scattered over the trunk/extremities are waxy brown to black plaques and papules with stuck on appearance  - Exam and clinical history consistent with seborrheic keratoses  - Counseled that these  are benign growths that do not require treatment  - Counseled that removal of lesions is considered cosmetic and so would incur a fee should patient elect to move forward.   - Patient to hold on treatments for now but will inform us should they desire additional treatments    MELANOCYTIC NEVI  -Relevant exam: Scattered over the trunk/extremities are homogenously pigmented brown macules and papules. ELM performed and without concerning findings.  - Exam and clinical history consistent with melanocytic nevi  - Educated on the ABCDE's of melanoma; handout provided  - Counseled to return to clinic prior to scheduled appointment should any of these lesions change or should any new lesions of concern arise  - Counseled on use of sun protection daily. Reviewed latest FDA sunscreen guidelines, including use of broad spectrum (UVA and UVB blocking) sunscreen or sun protective clothing with SPF 30-50 every 2-3 hours and reapplied after exposure to water; use of photoprotective clothing, including a broad brim hat and UPF rated clothing if outdoors for several hours; avoid use of tanning beds as these pose significant risk for melanoma and skin cancer.    LENTIGINES  OTHER SKIN CHANGES DUE TO CHRONIC EXPOSURE TO NONIONIZING RADIATION  - Relevant exam: Over sun exposed areas are brown macules. ELM performed and without concerning findings.  - Exam and clinical history consistent with lentigines.  - Educated that these are indicative of prior sun exposure.   - Counseled to return to clinic prior to scheduled appointment should any of these lesions change or should any new lesions of concern arise.  - Recommended use of sunscreen as above and below.  - Counseled on use of sun protection daily. Reviewed latest FDA sunscreen guidelines, including use of broad spectrum (UVA and UVB blocking) sunscreen or sun protective clothing with SPF 30-50 every 2-3 hours and reapplied after exposure to water; use of photoprotective clothing,  including a broad brim hat and UPF rated clothing if outdoors for several hours; avoid use of tanning beds as these pose significant risk for melanoma and skin cancer.    CHERRY ANGIOMAS  - Relevant exam: Scattered over the trunk/extremities are red papules  - Exam and clinical history consistent with cherry angiomas  - Educated that these are benign  - Educated that removal is considered aesthetic and would incur a fee.  - Patient does not wish to pursue removal at this time but will contact us should this change.    ACTINIC KERATOSES  - Relevant exam: On the right cheek are gritty pink papules  - Exam and clinical history consistent with actinic keratoses  - Discussed that these lesions are considered premalignant with the potential to evolve into squamous cell carcinoma.   - Discussed that these are due to chronic sun exposure and therefore recommend use of sunscreen/sun protection to prevent further sun damage  - Discussed treatment options, including liquid nitrogen destruction, topical immunotherapy, and photodynamic therapy, including risks, benefits    OPTION 1:     - The patient agreed to treatment with combination efudex/calcipotriene for **4 days BID to the face; 6 days BID to the extremities/trunk; 7 days BID to the scalp** - Common side effects for this treatment were discussed and handout provided  - Prescription ordered through [facility]. Informed patient that [facility] would contact patient to obtain billing information and shipping address. Patient provided the below preferred contact information for pharmacy:     Email:   Phone:      OPTION 2:     - The patient agreed to treatment with topical efudex 5% for **7 days BID to the face; 14 days BID to the extremities/trunk; 21 days BID to the scalp**   - Common side effects for this treatment were discussed and handout provided    OPTION 3:    PROCEDURE:  DESTRUCTION OF PRE-MALIGNANT LESIONS    - After a thorough discussion of treatment  options and risk/benefits/alternatives (including but not limited to local pain, scarring, dyspigmentation, blistering, and possible superinfection), verbal and written consent were obtained and the aforementioned lesions were treated on with cryotherapy using liquid nitrogen x 1 cycle for 5-10 seconds.    TOTAL NUMBER of 1 pre-malignant lesions were treated today on the ANATOMIC LOCATION: Right Cheek.     The patient tolerated the procedure well, and after-care instructions were provided.     Scribe Attestation    I,:  Melonie Adams MA am acting as a scribe while in the presence of the attending physician.:       I,:  Amairani Rodriguez MD personally performed the services described in this documentation    as scribed in my presence.:

## 2025-07-03 ENCOUNTER — ESTABLISHED COMPREHENSIVE EXAM (OUTPATIENT)
Dept: URBAN - METROPOLITAN AREA CLINIC 6 | Facility: CLINIC | Age: 89
End: 2025-07-03

## 2025-07-03 DIAGNOSIS — H35.3231: ICD-10-CM

## 2025-07-03 DIAGNOSIS — E11.3293: ICD-10-CM

## 2025-07-03 DIAGNOSIS — H40.053: ICD-10-CM

## 2025-07-03 DIAGNOSIS — H43.813: ICD-10-CM

## 2025-07-03 DIAGNOSIS — H26.491: ICD-10-CM

## 2025-07-03 PROCEDURE — 92133 CPTRZD OPH DX IMG PST SGM ON: CPT

## 2025-07-03 PROCEDURE — 92014 COMPRE OPH EXAM EST PT 1/>: CPT

## 2025-07-03 ASSESSMENT — VISUAL ACUITY
OD_CC: 20/30-1
OS_CC: 20/40-1

## 2025-07-03 ASSESSMENT — TONOMETRY
OD_IOP_MMHG: 8
OS_IOP_MMHG: 9